# Patient Record
Sex: FEMALE | Race: WHITE | Employment: UNEMPLOYED | URBAN - METROPOLITAN AREA
[De-identification: names, ages, dates, MRNs, and addresses within clinical notes are randomized per-mention and may not be internally consistent; named-entity substitution may affect disease eponyms.]

---

## 2017-05-22 ENCOUNTER — HOSPITAL ENCOUNTER (OUTPATIENT)
Dept: OTHER | Age: 37
Discharge: HOME | End: 2017-05-22
Admitting: CLINIC/CENTER

## 2017-05-22 LAB
REPORT STATUS: NORMAL
REQUEST PROBLEM: NORMAL
SPECIMEN: NORMAL
WET PREP: NORMAL

## 2017-05-24 LAB
CHLAMYDIA TRACHOMATIS AMPLIFIED DET: NEGATIVE
N GONORRHOEAE AMPLIFIED DET: NEGATIVE

## 2017-11-02 ENCOUNTER — HOSPITAL ENCOUNTER (OUTPATIENT)
Dept: PHYSICAL THERAPY | Age: 37
Discharge: HOME | End: 2017-11-30
Attending: FAMILY MEDICINE | Admitting: FAMILY MEDICINE

## 2017-11-04 ASSESSMENT — PAIN DESCRIPTION - LOCATION: LOCATION: LEG;BACK

## 2017-11-04 ASSESSMENT — PAIN DESCRIPTION - ORIENTATION: ORIENTATION: RIGHT

## 2017-11-04 ASSESSMENT — PAIN SCALES - GENERAL: PAINLEVEL_OUTOF10: 5

## 2017-11-04 NOTE — PROGRESS NOTES
interventions checked in plan of care;patient's level of function has been impacted by back pain since 2010; did not observe any barriers to learning during PT eval; learning preferences include demonstration, practice, and handouts; patient expressed understanding of HEP; patient appears to be motivated to participate in an active PT program and to be compliant with HEP expectations;patient assisted in developing treatment plan and goals; no DME is currently being used;      Current functional level (based on )   :  Conditions Requiring Skilled Therapeutic Intervention  Body structures, Functions, Activity limitations: Decreased high-level IADLs  Treatment Diagnosis: LBP  Prognosis: Fair  Decision Making: Low Complexity  Exam: Oswestry/trunk ROM  Clinical Presentation: stable  REQUIRES PT FOLLOW UP: Yes         Plan    see POC    G-Code       OutComes Score   see goals                                                  Goals  Short term goals  Time Frame for Short term goals: check @ 10 sessions  Short term goal 1: 48% Oswestry - @ eval=52%       Therapy Time   Individual Concurrent Group Co-treatment   Time In 1031         Time Out 1100         Minutes 29         Timed Code Treatment Minutes: 8 Minutes       AMMON Curry, PT

## 2017-11-14 ENCOUNTER — HOSPITAL ENCOUNTER (OUTPATIENT)
Dept: PHYSICAL THERAPY | Age: 37
Discharge: HOME | End: 2017-11-14
Admitting: FAMILY MEDICINE

## 2017-12-01 ENCOUNTER — HOSPITAL ENCOUNTER (OUTPATIENT)
Dept: OTHER | Age: 37
Discharge: HOME | End: 2017-12-31
Attending: FAMILY MEDICINE | Admitting: FAMILY MEDICINE

## 2017-12-05 ENCOUNTER — HOSPITAL ENCOUNTER (OUTPATIENT)
Dept: PHYSICAL THERAPY | Age: 37
Discharge: HOME | End: 2017-12-05
Admitting: FAMILY MEDICINE

## 2017-12-05 NOTE — FLOWSHEET NOTE
given cont   Objective   findings:   Pelvic asymmetry with L post innomminate Improvement of pain with the therapy   Provider interaction:  evaluation Pt instr in CLam shell and hip abd str ex for rehab progression MET for correction of pelvic asymmetry and Verbal and manual cueing on proper performance of the prescribed exercise or of the designated task Verbal and manual cueing on proper performance of the prescribed exercise or of the designated task   Response to intervention:   Pt understood discussion and questions were answered. Pt understands their activity limitations and understands rational for treatment progression.  Monitoring to ensure safe and effective activity performance Less pain Decrease of pain   Plan for Next Session: Progress as able Cont PT progress ex as tolerated Cont PT progress ex as tolerated Cont PT progress ex as tolerated     Modality/intervention used:  [x] Therapeutic Exercise  [] Modalities:  [] Therapeutic Activity     [] Ultrasound  [] Elec  Stim  [] Gait Training      [] Cervical Traction [] Lumbar Traction  [] Neuromuscular Re-education    [] Cold/hotpack [] Iontophoresis   [] Instruction in HEP      [] Vasopneumatic     [] Manual Therapy               [] Self care home management                    (    ) Dry needling    Post Tx Pain Ratin/10    Plan:(Fequency/duration/# of visits)   [x] Continue per plan of care [] Alter current plan   [] Plan of care initiated [] Hold pending MD visit [] Discharge         Time In: 435  Time Out: 515  Timed Code Treatment Minutes: 40  Total Treatment Minutes: 40    Electronically signed by:  Isaac Lorenzana 2017, 4:39 PM

## 2018-01-01 ENCOUNTER — HOSPITAL ENCOUNTER (OUTPATIENT)
Dept: OTHER | Age: 38
Discharge: HOME | End: 2018-01-31
Attending: FAMILY MEDICINE | Admitting: FAMILY MEDICINE

## 2018-01-11 ENCOUNTER — HOSPITAL ENCOUNTER (OUTPATIENT)
Dept: PHYSICAL THERAPY | Age: 38
Discharge: HOME | End: 2018-01-11
Admitting: FAMILY MEDICINE

## 2018-01-11 NOTE — FLOWSHEET NOTE
Physical Therapy Daily Treatment Note  Date:  2018    Patient Name:  Senia Ugalde    :  1980  MRN: [id number]  Restrictions/Precautions:    Diagnosis: LBP  Treatment Diagnosis: LBP  Insurance/Certification information:  -allowed 30 PCY  Next Physician Visit:  18 orthopedic in [place]  Referring Physician:    JESSENIA EXPIRATION DATE 18  Visit# / total visits:  7/                  Initial Pain level: 6/10 Mid to LB    Subjective:  Pt  states that her hands are hurting  her-and going to see an Orthopedic tomorrow. I'm not sleeping at all my hands hurt so bad and swollen    Any changes to Ambulatory Summary Sheet? no             Goals:  Short term goals  Time Frame for Short term goals: check @ 10 sessions  Short term goal 1: 48% Oswestry - @ eval=52%     Patient's goal:no back pain  Skilled PT activities: Date 17 Date 11/10/17 #2 Date  17   #3 Date  17   #4 12/15/17 #5 17 #6 18 #7     Outcome measure used   Oswestry       On visit#1            Self care managment Pt received education on their current pathology and how their condition affects functional activities. .               nustep    Seat 8 arms9 lv1 6 min  (pt reported pain in RLB) nustep    Seat 8 arms9 lv1 10 min Nustep S/8/A 9 x 10 min w/ LV 2 Nustep S/8/ x 10 min w/ LV 3 Nustep S/8/ x 10 min w/ LV 3 Nustep S/8/ x 10 min w/ LV 4       TM trial .5 mph 5 min            ellipitical trial  x 5 min X 8 min letting go intermittently to work the core X 8 min letting go intermittently to work the core X 8 min letting go intermittently to work the core Declined- hands hurt so bad       cybex trunk EXT 30 # x10; 35# x10; 40 # x10 reps   [id number] 40#  cybex trunk EXT 30 # x10; 35# x10; 40 # x10 reps  cybex trunk EXT 30 # x10; 35# x10; 40 # x10 reps cybex trunk EXT 40 # x10; 45# x10; 50 # x10 reps       Clam shells #s 1/2/3 R/L x10 reps each Clam shells #s 1/2/3 R/L x10 reps each Clam shells #s 1/2/3 R/L x15

## 2018-02-01 ENCOUNTER — HOSPITAL ENCOUNTER (OUTPATIENT)
Dept: OTHER | Age: 38
Discharge: HOME | End: 2018-02-27
Attending: FAMILY MEDICINE | Admitting: FAMILY MEDICINE

## 2018-09-10 ENCOUNTER — HOSPITAL ENCOUNTER (OUTPATIENT)
Dept: OTHER | Age: 38
Discharge: HOME | End: 2018-09-10
Attending: CLINIC/CENTER | Admitting: CLINIC/CENTER

## 2018-09-13 LAB
CHLAMYDIA TRACHOMATIS AMPLIFIED DET: NEGATIVE
N GONORRHOEAE AMPLIFIED DET: NEGATIVE
T. VAGINALIS BY TMA: NEGATIVE

## 2019-01-23 ENCOUNTER — APPOINTMENT (OUTPATIENT)
Dept: PSYCHIATRY | Age: 39
End: 2019-01-23
Payer: COMMERCIAL

## 2019-01-30 ENCOUNTER — HOSPITAL ENCOUNTER (OUTPATIENT)
Dept: PSYCHIATRY | Age: 39
Setting detail: THERAPIES SERIES
Discharge: HOME | End: 2019-01-30
Payer: COMMERCIAL

## 2019-01-30 PROCEDURE — 80305 DRUG TEST PRSMV DIR OPT OBS: CPT

## 2019-01-30 PROCEDURE — 90834 PSYTX W PT 45 MINUTES: CPT

## 2019-02-06 ENCOUNTER — APPOINTMENT (OUTPATIENT)
Dept: PSYCHIATRY | Age: 39
End: 2019-02-06
Payer: COMMERCIAL

## 2019-02-13 ENCOUNTER — HOSPITAL ENCOUNTER (OUTPATIENT)
Dept: PSYCHIATRY | Age: 39
Setting detail: THERAPIES SERIES
Discharge: HOME | End: 2019-02-13
Payer: COMMERCIAL

## 2019-02-13 PROCEDURE — 90834 PSYTX W PT 45 MINUTES: CPT

## 2019-02-19 ENCOUNTER — HOSPITAL ENCOUNTER (OUTPATIENT)
Dept: PSYCHIATRY | Age: 39
Setting detail: THERAPIES SERIES
Discharge: HOME | End: 2019-02-19
Payer: COMMERCIAL

## 2019-02-19 PROCEDURE — 90853 GROUP PSYCHOTHERAPY: CPT

## 2019-02-20 ENCOUNTER — HOSPITAL ENCOUNTER (OUTPATIENT)
Dept: PSYCHIATRY | Age: 39
Setting detail: THERAPIES SERIES
Discharge: HOME | End: 2019-02-20
Payer: COMMERCIAL

## 2019-02-20 ENCOUNTER — APPOINTMENT (OUTPATIENT)
Dept: PSYCHIATRY | Age: 39
End: 2019-02-20
Payer: COMMERCIAL

## 2019-02-20 PROCEDURE — 90832 PSYTX W PT 30 MINUTES: CPT

## 2019-02-26 ENCOUNTER — HOSPITAL ENCOUNTER (OUTPATIENT)
Dept: PSYCHIATRY | Age: 39
Setting detail: THERAPIES SERIES
Discharge: HOME | End: 2019-02-26
Payer: COMMERCIAL

## 2019-02-26 PROCEDURE — 90853 GROUP PSYCHOTHERAPY: CPT

## 2019-02-27 ENCOUNTER — HOSPITAL ENCOUNTER (OUTPATIENT)
Dept: PSYCHIATRY | Age: 39
Setting detail: THERAPIES SERIES
Discharge: HOME | End: 2019-02-27
Payer: COMMERCIAL

## 2019-02-27 ENCOUNTER — APPOINTMENT (OUTPATIENT)
Dept: PSYCHIATRY | Age: 39
End: 2019-02-27
Payer: COMMERCIAL

## 2019-02-27 PROCEDURE — 80305 DRUG TEST PRSMV DIR OPT OBS: CPT

## 2019-02-27 PROCEDURE — 90834 PSYTX W PT 45 MINUTES: CPT

## 2019-03-05 ENCOUNTER — HOSPITAL ENCOUNTER (OUTPATIENT)
Dept: PSYCHIATRY | Age: 39
Setting detail: THERAPIES SERIES
Discharge: HOME | End: 2019-03-05
Payer: COMMERCIAL

## 2019-03-05 PROCEDURE — 90853 GROUP PSYCHOTHERAPY: CPT

## 2019-03-06 ENCOUNTER — APPOINTMENT (OUTPATIENT)
Dept: PSYCHIATRY | Age: 39
End: 2019-03-06
Payer: COMMERCIAL

## 2019-03-08 ENCOUNTER — HOSPITAL ENCOUNTER (OUTPATIENT)
Dept: PSYCHIATRY | Age: 39
Setting detail: THERAPIES SERIES
Discharge: HOME | End: 2019-03-08
Payer: COMMERCIAL

## 2019-03-08 PROCEDURE — 90834 PSYTX W PT 45 MINUTES: CPT

## 2019-03-08 PROCEDURE — 90853 GROUP PSYCHOTHERAPY: CPT

## 2019-03-12 ENCOUNTER — APPOINTMENT (OUTPATIENT)
Dept: PSYCHIATRY | Age: 39
End: 2019-03-12
Payer: COMMERCIAL

## 2019-03-13 ENCOUNTER — HOSPITAL ENCOUNTER (OUTPATIENT)
Dept: PSYCHIATRY | Age: 39
Setting detail: THERAPIES SERIES
Discharge: HOME | End: 2019-03-13
Payer: COMMERCIAL

## 2019-03-13 PROCEDURE — 90834 PSYTX W PT 45 MINUTES: CPT

## 2019-03-19 ENCOUNTER — HOSPITAL ENCOUNTER (OUTPATIENT)
Dept: PSYCHIATRY | Age: 39
Setting detail: THERAPIES SERIES
Discharge: HOME | End: 2019-03-19
Payer: COMMERCIAL

## 2019-03-19 PROCEDURE — 90853 GROUP PSYCHOTHERAPY: CPT

## 2019-03-20 ENCOUNTER — HOSPITAL ENCOUNTER (OUTPATIENT)
Dept: PSYCHIATRY | Age: 39
Setting detail: THERAPIES SERIES
End: 2019-03-20
Payer: COMMERCIAL

## 2019-03-26 ENCOUNTER — APPOINTMENT (OUTPATIENT)
Dept: PSYCHIATRY | Age: 39
End: 2019-03-26
Payer: COMMERCIAL

## 2019-03-27 ENCOUNTER — HOSPITAL ENCOUNTER (OUTPATIENT)
Dept: PSYCHIATRY | Age: 39
Setting detail: THERAPIES SERIES
Discharge: HOME | End: 2019-03-27
Payer: COMMERCIAL

## 2019-03-27 PROCEDURE — 90834 PSYTX W PT 45 MINUTES: CPT

## 2019-03-27 PROCEDURE — 80305 DRUG TEST PRSMV DIR OPT OBS: CPT

## 2019-04-09 ENCOUNTER — APPOINTMENT (OUTPATIENT)
Dept: PSYCHIATRY | Age: 39
End: 2019-04-09
Payer: COMMERCIAL

## 2019-04-10 ENCOUNTER — HOSPITAL ENCOUNTER (OUTPATIENT)
Dept: PSYCHIATRY | Age: 39
Setting detail: THERAPIES SERIES
Discharge: HOME | End: 2019-04-10
Payer: COMMERCIAL

## 2019-04-10 PROCEDURE — 90853 GROUP PSYCHOTHERAPY: CPT

## 2019-04-10 PROCEDURE — 90834 PSYTX W PT 45 MINUTES: CPT

## 2019-04-16 ENCOUNTER — HOSPITAL ENCOUNTER (OUTPATIENT)
Dept: PSYCHIATRY | Age: 39
Setting detail: THERAPIES SERIES
Discharge: HOME | End: 2019-04-16
Payer: COMMERCIAL

## 2019-04-16 PROCEDURE — 90853 GROUP PSYCHOTHERAPY: CPT

## 2019-04-17 ENCOUNTER — APPOINTMENT (OUTPATIENT)
Dept: PSYCHIATRY | Age: 39
End: 2019-04-17
Payer: COMMERCIAL

## 2019-04-23 ENCOUNTER — APPOINTMENT (OUTPATIENT)
Dept: PSYCHIATRY | Age: 39
End: 2019-04-23
Payer: COMMERCIAL

## 2019-04-24 ENCOUNTER — HOSPITAL ENCOUNTER (OUTPATIENT)
Dept: PSYCHIATRY | Age: 39
Setting detail: THERAPIES SERIES
Discharge: HOME | End: 2019-04-24
Payer: COMMERCIAL

## 2019-04-24 PROCEDURE — 80305 DRUG TEST PRSMV DIR OPT OBS: CPT

## 2019-04-24 PROCEDURE — 90834 PSYTX W PT 45 MINUTES: CPT

## 2019-04-30 ENCOUNTER — APPOINTMENT (OUTPATIENT)
Dept: PSYCHIATRY | Age: 39
End: 2019-04-30
Payer: COMMERCIAL

## 2019-05-01 ENCOUNTER — APPOINTMENT (OUTPATIENT)
Dept: PSYCHIATRY | Age: 39
End: 2019-05-01
Payer: COMMERCIAL

## 2019-05-07 ENCOUNTER — HOSPITAL ENCOUNTER (OUTPATIENT)
Dept: PSYCHIATRY | Age: 39
Setting detail: THERAPIES SERIES
Discharge: HOME | End: 2019-05-07
Payer: COMMERCIAL

## 2019-05-07 PROCEDURE — 90853 GROUP PSYCHOTHERAPY: CPT

## 2019-05-08 ENCOUNTER — HOSPITAL ENCOUNTER (OUTPATIENT)
Dept: PSYCHIATRY | Age: 39
Setting detail: THERAPIES SERIES
Discharge: HOME | End: 2019-05-08
Payer: COMMERCIAL

## 2019-05-08 ENCOUNTER — APPOINTMENT (OUTPATIENT)
Dept: PSYCHIATRY | Age: 39
End: 2019-05-08
Payer: COMMERCIAL

## 2019-05-08 PROCEDURE — 90832 PSYTX W PT 30 MINUTES: CPT

## 2019-05-11 ENCOUNTER — HOSPITAL ENCOUNTER (EMERGENCY)
Age: 39
Discharge: HOME | End: 2019-05-11
Payer: COMMERCIAL

## 2019-05-11 VITALS
BODY MASS INDEX: 31.28 KG/M2 | WEIGHT: 170 LBS | RESPIRATION RATE: 16 BRPM | TEMPERATURE: 98.4 F | HEIGHT: 62 IN | SYSTOLIC BLOOD PRESSURE: 116 MMHG | OXYGEN SATURATION: 96 % | DIASTOLIC BLOOD PRESSURE: 83 MMHG | HEART RATE: 84 BPM

## 2019-05-11 DIAGNOSIS — S61.210A LACERATION OF RIGHT INDEX FINGER WITHOUT FOREIGN BODY WITHOUT DAMAGE TO NAIL, INITIAL ENCOUNTER: ICD-10-CM

## 2019-05-11 DIAGNOSIS — S61.411A LACERATION OF RIGHT HAND WITHOUT FOREIGN BODY, INITIAL ENCOUNTER: Primary | ICD-10-CM

## 2019-05-11 PROCEDURE — 90471 IMMUNIZATION ADMIN: CPT | Performed by: PHYSICIAN ASSISTANT

## 2019-05-11 PROCEDURE — 4500000027

## 2019-05-11 PROCEDURE — 2500000003 HC RX 250 WO HCPCS: Performed by: PHYSICIAN ASSISTANT

## 2019-05-11 PROCEDURE — 6360000002 HC RX W HCPCS: Performed by: PHYSICIAN ASSISTANT

## 2019-05-11 PROCEDURE — 99282 EMERGENCY DEPT VISIT SF MDM: CPT

## 2019-05-11 PROCEDURE — 90715 TDAP VACCINE 7 YRS/> IM: CPT | Performed by: PHYSICIAN ASSISTANT

## 2019-05-11 RX ORDER — LIDOCAINE HYDROCHLORIDE 20 MG/ML
5 INJECTION, SOLUTION EPIDURAL; INFILTRATION; INTRACAUDAL; PERINEURAL ONCE
Status: COMPLETED | OUTPATIENT
Start: 2019-05-11 | End: 2019-05-11

## 2019-05-11 RX ADMIN — LIDOCAINE HYDROCHLORIDE 5 ML: 20 INJECTION, SOLUTION EPIDURAL; INFILTRATION; INTRACAUDAL at 10:30

## 2019-05-11 RX ADMIN — TETANUS TOXOID, REDUCED DIPHTHERIA TOXOID AND ACELLULAR PERTUSSIS VACCINE, ADSORBED 0.5 ML: 5; 2.5; 8; 8; 2.5 SUSPENSION INTRAMUSCULAR at 11:02

## 2019-05-11 ASSESSMENT — PAIN DESCRIPTION - LOCATION: LOCATION: HAND

## 2019-05-11 ASSESSMENT — PAIN DESCRIPTION - PAIN TYPE: TYPE: ACUTE PAIN

## 2019-05-11 ASSESSMENT — PAIN SCALES - GENERAL: PAINLEVEL_OUTOF10: 6

## 2019-05-11 ASSESSMENT — PAIN DESCRIPTION - ORIENTATION: ORIENTATION: RIGHT

## 2019-05-11 NOTE — ED PROVIDER NOTES
[facility]        PCP: Andree Colindres MD    CHIEF COMPLAINT    No chief complaint on file. HPI    Allyson Lizarraga is a 43 y.o. female who presents with lacerations to right hand and right index finger. Onset prior to arrival.  Context is patient was cleaning dishes when a glass broke cutting her hand and index finger. Is associated pain and bleeding. Bleeding has subsided. Pain does not radiate. No distal numbness, tingling, weakness, functional/motor deficit. Pt denies foreign body sensation. Unknown Tetanus Status        REVIEW OF SYSTEMS    General:   Denies symptoms preceding injury. Skin:  SEE HPI  Musculoskeletal:  No distal numbness, tingling. No obvious tendon or motor deficits. Denies any other musculoskeletal injuries or skin trauma. All other review of systems are negative  See HPI and nursing notes for additional information     PAST MEDICAL & SURGICAL HISTORY    Past Medical History:   Diagnosis Date    Anxiety     Colon polyp 4-7-2015    Hemorrhoids 4-7-2015    Hepatitis C     Migraines      Past Surgical History:   Procedure Laterality Date    ADENOIDECTOMY      CHOLECYSTECTOMY      COLONOSCOPY  04/07/2015    Polyp cecum, hemorrhoids    TONSILLECTOMY      TUBAL LIGATION         CURRENT MEDICATIONS    Current Outpatient Rx   Medication Sig Dispense Refill    naproxen (NAPROSYN) 500 MG tablet Take 1 tablet by mouth 2 times daily 60 tablet 0    mineral oil-hydrophilic petrolatum (AQUAPHOR) ointment Apply topically as needed. 396 g 0    ondansetron (ZOFRAN ODT) 4 MG disintegrating tablet Take 1 tablet by mouth every 8 hours as needed for Nausea or Vomiting 10 tablet 0    ibuprofen (ADVIL;MOTRIN) 800 MG tablet Take 1 tablet by mouth every 6 hours as needed for Pain 90 tablet 0    sertraline (ZOLOFT) 50 MG tablet Take 50 mg by mouth daily.  topiramate (TOPAMAX) 50 MG tablet Take 50 mg by mouth 2 times daily.       vitamin B-12 motor deficits. Distal sensation and capillary refill intact. Vascular: Distal pulses and capillary refill intact. Integument:    Laceration #1: Over the dorsal aspect of the right hand in the region of the second metacarpal head there is a flap laceration measuring approximately 1.6 cm and length. No active bleeding. No obvious foreign body. No obvious tendon involvement    Distal sensation, capillary refill intact. Distal joints with full range of motion    Laceration #2:  Over the radial aspect of the proximal phalanx of the right index finger there is a flap laceration measuring approximately 1.7 cm in length. No active bleeding. No obvious foreign body. Obvious tendon involvement. Distal sensation, capillary refill intact. Distal joints with full range of motion. See below for further details. Neurologic:  Awake and alert, normal flow of speech. CN 2-12 intact. Psychiatric: Cooperative, pleasant affect        RADIOLOGY/PROCEDURES    Pt elects to hold on x-rays right hand today and understands that I am unable to fully evaluate for presenting symptoms by omiting this. The patient understands they may return to the ED at any time, without penalty or recrimminations, for reevaluation and to have ridging done.        ________________________________________________________________________       Procedure Note - Yash Morris PA-C      Laceration Repair Procedure Note    Indication: Skin Laceration - #1, right hand    Procedure:   - Procedure explained, including risks and benefits explained to the patient who expressed understanding. All questions were answered. Verbal consent obtained. - The Wound was prepped and draped in the usual sterile fashion using Betadine and sterile saline.  - The wound is anesthetized using 2% lidocaine, approximately 2 ml  - Wound was explored to it's depth:    no foreign bodies.         no compromise of neurovascular or tendon structures  - Wound was irrigated with copious amounts of sterile saline and mechanically debrided utilizing sterile gauze. - The laceration was Closed with 5-0 Prolene sutures, total number of 3,  simple interrupted  - Hemostasis and good cosmesis was achieved. Blood loss minimal.  - The wound area was then dressed with Sterile nonstick dressing, sterile gauze, and tape. - Patient tolerated procedure well without complications. Post procedure exam of the affected region reveals distal sensation, motor, capillary refill, and pulses intact    Total repaired wound length: 1.6cm  ________________________________________________________________________      ________________________________________________________________________       Procedure Note - Gurdeep Wilkerson PA-C      Laceration Repair Procedure Note    Indication: Skin Laceration #2, right index finger    Procedure:   - Procedure explained, including risks and benefits explained to the patient who expressed understanding. All questions were answered. Verbal consent obtained. - The Wound was prepped and draped in the usual sterile fashion using Betadine and sterile saline.  - The wound is anesthetized using 2% lidocaine, approximately 1 ml  - Wound was explored to it's depth:    no foreign bodies. no compromise of neurovascular or tendon structures  - Wound was irrigated with copious amounts of sterile saline and mechanically debrided utilizing sterile gauze. - The laceration was Closed with 5-0 proline sutures, total number of 3, simple interrupted  - Hemostasis and good cosmesis was achieved. Blood loss minimal.  - The wound area was then dressed with Sterile nonstick dressing, sterile gauze, and tape. - Patient tolerated procedure well without complications.     Post procedure exam of the affected region reveals distal sensation, motor, capillary refill, and pulses intact    Total repaired wound length: 1.7cm  ________________________________________________________________________        ED COURSE & MEDICAL DECISION MAKING        I discussed possibility of infection, retained foreign body, tendon injury, nerve injury. Wound care instructions discussed with patient today. Wound check in 2-3 days. Suture/Staple removal in 7 days. (discussed today). Patient placed in splint today to avoid aggressive hand gripping and possible subsequent tearing up sutures-discussed in detail today. May remove splint several times daily. Diagnosis and plan discussed in detail with patient who understands and agrees. Return to emergency Department precautions were discussed in detail with patient who understands and agrees. Vital signs and nursing notes reviewed during ED course. I have independently evaluated this patient. Clinical  IMPRESSION    1. Laceration of right hand without foreign body, initial encounter    2. Laceration of right index finger without foreign body without damage to nail, initial encounter              Comment: Please note this report has been produced using speech recognition software and may contain errors related to that system including errors in grammar, punctuation, and spelling, as well as words and phrases that may be inappropriate. If there are any questions or concerns please feel free to contact the dictating provider for clarification.          Saroj Philip  05/11/19 [id number]

## 2019-05-11 NOTE — ED NOTES
Discharge instructions reviewed with patient. PT verbalizes understanding. All questions answered. Follow up instructions given. PT denies any further needs at this time.       Rony Trevino LPN  90/73/26 [id number]

## 2019-05-11 NOTE — ED NOTES
Applied splint to right pointer  finger  merna taped  to next finger     Nithya Chino  05/11/19 0250

## 2019-05-11 NOTE — LETTER
[facility] Emergency Department  [address] [id number]  Phone: [phone]  Fax: [phone]               May 11, 2019    Patient: Nazario Macario   YOB: 1980   Date of Visit: 5/11/2019       To Whom It May Concern:    Kavya Cary was seen and treated in our emergency department on 5/11/2019. I recommend no gripping or lifting with right hand for one week. Patient to keep wounds clean and dry and covered while at work.       Sincerely,       ECHO Marcos         Signature:__________________________________

## 2019-05-14 ENCOUNTER — APPOINTMENT (OUTPATIENT)
Dept: PSYCHIATRY | Age: 39
End: 2019-05-14
Payer: COMMERCIAL

## 2019-05-15 ENCOUNTER — HOSPITAL ENCOUNTER (OUTPATIENT)
Dept: PSYCHIATRY | Age: 39
Setting detail: THERAPIES SERIES
Discharge: HOME | End: 2019-05-15
Payer: COMMERCIAL

## 2019-05-15 PROCEDURE — 80305 DRUG TEST PRSMV DIR OPT OBS: CPT

## 2019-05-15 PROCEDURE — 90834 PSYTX W PT 45 MINUTES: CPT

## 2019-05-21 ENCOUNTER — APPOINTMENT (OUTPATIENT)
Dept: PSYCHIATRY | Age: 39
End: 2019-05-21
Payer: COMMERCIAL

## 2019-05-22 ENCOUNTER — HOSPITAL ENCOUNTER (OUTPATIENT)
Dept: PSYCHIATRY | Age: 39
Setting detail: THERAPIES SERIES
Discharge: HOME | End: 2019-05-22
Payer: COMMERCIAL

## 2019-05-22 PROCEDURE — 90834 PSYTX W PT 45 MINUTES: CPT

## 2019-05-28 ENCOUNTER — HOSPITAL ENCOUNTER (OUTPATIENT)
Dept: PSYCHIATRY | Age: 39
Setting detail: THERAPIES SERIES
Discharge: HOME | End: 2019-05-28
Payer: COMMERCIAL

## 2019-05-28 PROCEDURE — 90853 GROUP PSYCHOTHERAPY: CPT

## 2019-05-29 ENCOUNTER — HOSPITAL ENCOUNTER (OUTPATIENT)
Dept: PSYCHIATRY | Age: 39
Setting detail: THERAPIES SERIES
Discharge: HOME | End: 2019-05-29
Payer: COMMERCIAL

## 2019-05-29 PROCEDURE — 90834 PSYTX W PT 45 MINUTES: CPT

## 2019-06-20 ENCOUNTER — HOSPITAL ENCOUNTER (OUTPATIENT)
Dept: GENERAL RADIOLOGY | Age: 39
Discharge: HOME | End: 2019-06-20
Payer: COMMERCIAL

## 2019-06-20 ENCOUNTER — HOSPITAL ENCOUNTER (OUTPATIENT)
Age: 39
Discharge: HOME | End: 2019-06-20
Payer: COMMERCIAL

## 2019-06-20 DIAGNOSIS — R06.00 DYSPNEA, UNSPECIFIED TYPE: ICD-10-CM

## 2019-06-20 PROCEDURE — 71046 X-RAY EXAM CHEST 2 VIEWS: CPT

## 2019-09-06 ENCOUNTER — HOSPITAL ENCOUNTER (OUTPATIENT)
Age: 39
Discharge: HOME | End: 2019-09-06
Payer: COMMERCIAL

## 2019-09-06 LAB
ALBUMIN SERPL-MCNC: 4.4 GM/DL (ref 3.4–5)
ALP BLD-CCNC: 69 IU/L (ref 40–128)
ALT SERPL-CCNC: 22 U/L (ref 10–40)
ANION GAP SERPL CALCULATED.3IONS-SCNC: 11 MMOL/L (ref 4–16)
AST SERPL-CCNC: 24 IU/L (ref 15–37)
BILIRUB SERPL-MCNC: 0.3 MG/DL (ref 0–1)
BUN BLDV-MCNC: 10 MG/DL (ref 6–23)
CALCIUM SERPL-MCNC: 9.4 MG/DL (ref 8.3–10.6)
CHLORIDE BLD-SCNC: 101 MMOL/L (ref 99–110)
CO2: 26 MMOL/L (ref 21–32)
CREAT SERPL-MCNC: 0.6 MG/DL (ref 0.6–1.1)
GFR AFRICAN AMERICAN: >60 ML/MIN/1.73M2
GFR NON-AFRICAN AMERICAN: >60 ML/MIN/1.73M2
GLUCOSE BLD-MCNC: 102 MG/DL (ref 70–99)
HAV IGM SER IA-ACNC: NON REACTIVE
HCT VFR BLD CALC: 44.9 % (ref 37–47)
HEMOGLOBIN: 14.7 GM/DL (ref 12.5–16)
HEPATITIS B CORE IGM ANTIBODY: NON REACTIVE
HEPATITIS B SURFACE ANTIGEN: NON REACTIVE
HEPATITIS C ANTIBODY: ABNORMAL
MCH RBC QN AUTO: 31.1 PG (ref 27–31)
MCHC RBC AUTO-ENTMCNC: 32.7 % (ref 32–36)
MCV RBC AUTO: 94.9 FL (ref 78–100)
PDW BLD-RTO: 13 % (ref 11.7–14.9)
PLATELET # BLD: 301 K/CU MM (ref 140–440)
PMV BLD AUTO: 9.9 FL (ref 7.5–11.1)
POTASSIUM SERPL-SCNC: 3.9 MMOL/L (ref 3.5–5.1)
RBC # BLD: 4.73 M/CU MM (ref 4.2–5.4)
SODIUM BLD-SCNC: 138 MMOL/L (ref 135–145)
TOTAL PROTEIN: 6.8 GM/DL (ref 6.4–8.2)
WBC # BLD: 9.2 K/CU MM (ref 4–10.5)

## 2019-09-06 PROCEDURE — 80074 ACUTE HEPATITIS PANEL: CPT

## 2019-09-06 PROCEDURE — 36415 COLL VENOUS BLD VENIPUNCTURE: CPT

## 2019-09-06 PROCEDURE — 80053 COMPREHEN METABOLIC PANEL: CPT

## 2019-09-06 PROCEDURE — 85027 COMPLETE CBC AUTOMATED: CPT

## 2019-10-05 ENCOUNTER — HOSPITAL ENCOUNTER (EMERGENCY)
Age: 39
Discharge: LEFT BEFORE DISCHARGE | End: 2019-10-06
Payer: COMMERCIAL

## 2019-10-05 VITALS
OXYGEN SATURATION: 98 % | WEIGHT: 145 LBS | BODY MASS INDEX: 26.68 KG/M2 | RESPIRATION RATE: 18 BRPM | HEIGHT: 62 IN | DIASTOLIC BLOOD PRESSURE: 92 MMHG | TEMPERATURE: 98 F | SYSTOLIC BLOOD PRESSURE: 147 MMHG | HEART RATE: 103 BPM

## 2019-10-05 PROCEDURE — 4500000002 HC ER NO CHARGE

## 2019-10-05 ASSESSMENT — PAIN SCALES - GENERAL: PAINLEVEL_OUTOF10: 5

## 2019-10-05 ASSESSMENT — PAIN DESCRIPTION - PAIN TYPE: TYPE: ACUTE PAIN

## 2019-10-05 ASSESSMENT — PAIN DESCRIPTION - LOCATION: LOCATION: FINGER (COMMENT WHICH ONE)

## 2019-10-05 ASSESSMENT — PAIN DESCRIPTION - ORIENTATION: ORIENTATION: RIGHT

## 2019-10-06 ENCOUNTER — HOSPITAL ENCOUNTER (EMERGENCY)
Age: 39
Discharge: HOME | End: 2019-10-06
Payer: COMMERCIAL

## 2019-10-06 ENCOUNTER — APPOINTMENT (OUTPATIENT)
Dept: GENERAL RADIOLOGY | Age: 39
End: 2019-10-06
Payer: COMMERCIAL

## 2019-10-06 VITALS
BODY MASS INDEX: 26.68 KG/M2 | WEIGHT: 145 LBS | HEART RATE: 80 BPM | HEIGHT: 62 IN | SYSTOLIC BLOOD PRESSURE: 143 MMHG | DIASTOLIC BLOOD PRESSURE: 97 MMHG | OXYGEN SATURATION: 99 % | RESPIRATION RATE: 20 BRPM | TEMPERATURE: 98.6 F

## 2019-10-06 DIAGNOSIS — S61.210A LACERATION OF RIGHT INDEX FINGER WITHOUT FOREIGN BODY WITHOUT DAMAGE TO NAIL, INITIAL ENCOUNTER: Primary | ICD-10-CM

## 2019-10-06 PROCEDURE — 4500000027: Performed by: PHYSICIAN ASSISTANT

## 2019-10-06 PROCEDURE — 90715 TDAP VACCINE 7 YRS/> IM: CPT | Performed by: PHYSICIAN ASSISTANT

## 2019-10-06 PROCEDURE — 99283 EMERGENCY DEPT VISIT LOW MDM: CPT

## 2019-10-06 PROCEDURE — 4500000027

## 2019-10-06 PROCEDURE — 90471 IMMUNIZATION ADMIN: CPT | Performed by: PHYSICIAN ASSISTANT

## 2019-10-06 PROCEDURE — 6360000002 HC RX W HCPCS: Performed by: PHYSICIAN ASSISTANT

## 2019-10-06 RX ADMIN — TETANUS TOXOID, REDUCED DIPHTHERIA TOXOID AND ACELLULAR PERTUSSIS VACCINE, ADSORBED 0.5 ML: 5; 2.5; 8; 8; 2.5 SUSPENSION INTRAMUSCULAR at 07:54

## 2019-12-25 ENCOUNTER — HOSPITAL ENCOUNTER (EMERGENCY)
Age: 39
Discharge: HOME | End: 2019-12-25
Attending: EMERGENCY MEDICINE
Payer: COMMERCIAL

## 2019-12-25 VITALS
TEMPERATURE: 98.2 F | DIASTOLIC BLOOD PRESSURE: 86 MMHG | WEIGHT: 150 LBS | BODY MASS INDEX: 27.6 KG/M2 | OXYGEN SATURATION: 98 % | RESPIRATION RATE: 16 BRPM | HEART RATE: 83 BPM | SYSTOLIC BLOOD PRESSURE: 129 MMHG | HEIGHT: 62 IN

## 2019-12-25 DIAGNOSIS — N39.0 ACUTE UTI: ICD-10-CM

## 2019-12-25 DIAGNOSIS — T19.2XXA FOREIGN BODY IN VAGINA, INITIAL ENCOUNTER: Primary | ICD-10-CM

## 2019-12-25 LAB
BACTERIA: ABNORMAL /HPF
BILIRUBIN URINE: ABNORMAL MG/DL
BLOOD, URINE: NEGATIVE
CALCIUM OXALATE CRYSTALS: ABNORMAL /HPF
CLARITY: ABNORMAL
COLOR: ABNORMAL
GLUCOSE, URINE: NEGATIVE MG/DL
ICTOTEST: NEGATIVE
INTERPRETATION: NORMAL
KETONES, URINE: NEGATIVE MG/DL
LEUKOCYTE ESTERASE, URINE: ABNORMAL
MUCUS: ABNORMAL HPF
NITRITE URINE, QUANTITATIVE: NEGATIVE
PH, URINE: 5 (ref 5–8)
PREGNANCY, URINE: NEGATIVE
PROTEIN UA: NEGATIVE MG/DL
RBC URINE: 3 /HPF (ref 0–6)
SPECIFIC GRAVITY UA: 1.03 (ref 1–1.03)
SPECIFIC GRAVITY, URINE: 1.03 (ref 1–1.03)
SQUAMOUS EPITHELIAL: 4 /HPF
TRICHOMONAS: ABNORMAL /HPF
UROBILINOGEN, URINE: 2 MG/DL (ref 0.2–1)
WBC UA: 7 /HPF (ref 0–5)

## 2019-12-25 PROCEDURE — 99283 EMERGENCY DEPT VISIT LOW MDM: CPT

## 2019-12-25 PROCEDURE — 81001 URINALYSIS AUTO W/SCOPE: CPT

## 2019-12-25 PROCEDURE — 6370000000 HC RX 637 (ALT 250 FOR IP): Performed by: EMERGENCY MEDICINE

## 2019-12-25 PROCEDURE — 81025 URINE PREGNANCY TEST: CPT

## 2019-12-25 RX ORDER — NITROFURANTOIN 25; 75 MG/1; MG/1
100 CAPSULE ORAL 2 TIMES DAILY
Qty: 14 CAPSULE | Refills: 0 | Status: SHIPPED | OUTPATIENT
Start: 2019-12-25 | End: 2020-01-01

## 2019-12-25 RX ORDER — NITROFURANTOIN 25; 75 MG/1; MG/1
100 CAPSULE ORAL ONCE
Status: COMPLETED | OUTPATIENT
Start: 2019-12-25 | End: 2019-12-25

## 2019-12-25 RX ADMIN — NITROFURANTOIN (MONOHYDRATE/MACROCRYSTALS) 100 MG: 75; 25 CAPSULE ORAL at 23:06

## 2019-12-25 ASSESSMENT — PAIN DESCRIPTION - PAIN TYPE: TYPE: ACUTE PAIN

## 2019-12-25 ASSESSMENT — PAIN DESCRIPTION - LOCATION: LOCATION: ABDOMEN

## 2019-12-25 ASSESSMENT — PAIN SCALES - GENERAL: PAINLEVEL_OUTOF10: 2

## 2019-12-25 ASSESSMENT — PAIN DESCRIPTION - DESCRIPTORS: DESCRIPTORS: CRAMPING

## 2020-03-09 ENCOUNTER — HOSPITAL ENCOUNTER (OUTPATIENT)
Dept: GENERAL RADIOLOGY | Age: 40
Discharge: HOME | End: 2020-03-09
Payer: COMMERCIAL

## 2020-03-09 ENCOUNTER — HOSPITAL ENCOUNTER (OUTPATIENT)
Age: 40
Discharge: HOME | End: 2020-03-09
Payer: COMMERCIAL

## 2020-03-09 PROCEDURE — 72100 X-RAY EXAM L-S SPINE 2/3 VWS: CPT

## 2021-06-01 ENCOUNTER — HOSPITAL ENCOUNTER (EMERGENCY)
Age: 41
Discharge: LEFT BEFORE DISCHARGE | End: 2021-06-01
Payer: COMMERCIAL

## 2021-06-01 ASSESSMENT — PAIN SCALES - GENERAL: PAINLEVEL_OUTOF10: 9

## 2021-06-01 ASSESSMENT — PAIN DESCRIPTION - PAIN TYPE: TYPE: ACUTE PAIN

## 2021-06-02 NOTE — ED NOTES
Pt would not let ED tech get vitals on her. Pt angrily walked out, stating Nithin Hobbs is not waiting all night\".      Duey Like, RN  06/01/21 [id number]

## 2022-03-16 ENCOUNTER — APPOINTMENT (OUTPATIENT)
Dept: GENERAL RADIOLOGY | Age: 42
End: 2022-03-16

## 2022-03-16 ENCOUNTER — HOSPITAL ENCOUNTER (EMERGENCY)
Age: 42
Discharge: HOME | End: 2022-03-16
Attending: EMERGENCY MEDICINE
Payer: COMMERCIAL

## 2022-03-16 ENCOUNTER — HOSPITAL ENCOUNTER (EMERGENCY)
Age: 42
Discharge: LEFT BEFORE DISCHARGE | End: 2022-03-16

## 2022-03-16 VITALS
HEART RATE: 105 BPM | BODY MASS INDEX: 26.68 KG/M2 | SYSTOLIC BLOOD PRESSURE: 113 MMHG | HEIGHT: 62 IN | OXYGEN SATURATION: 98 % | RESPIRATION RATE: 18 BRPM | TEMPERATURE: 98 F | DIASTOLIC BLOOD PRESSURE: 73 MMHG | WEIGHT: 145 LBS

## 2022-03-16 VITALS
HEART RATE: 111 BPM | RESPIRATION RATE: 20 BRPM | SYSTOLIC BLOOD PRESSURE: 140 MMHG | TEMPERATURE: 98.4 F | DIASTOLIC BLOOD PRESSURE: 98 MMHG | OXYGEN SATURATION: 100 %

## 2022-03-16 DIAGNOSIS — L03.119 CELLULITIS OF UPPER EXTREMITY, UNSPECIFIED LATERALITY: Primary | ICD-10-CM

## 2022-03-16 PROCEDURE — 96372 THER/PROPH/DIAG INJ SC/IM: CPT

## 2022-03-16 PROCEDURE — 6360000002 HC RX W HCPCS: Performed by: EMERGENCY MEDICINE

## 2022-03-16 PROCEDURE — 99284 EMERGENCY DEPT VISIT MOD MDM: CPT

## 2022-03-16 RX ORDER — ARIPIPRAZOLE 2 MG/1
TABLET ORAL
COMMUNITY
Start: 2022-01-12

## 2022-03-16 RX ORDER — IBUPROFEN 600 MG/1
600 TABLET ORAL 3 TIMES DAILY PRN
Qty: 30 TABLET | Refills: 0 | Status: SHIPPED | OUTPATIENT
Start: 2022-03-16

## 2022-03-16 RX ORDER — GABAPENTIN 400 MG/1
800 CAPSULE ORAL 3 TIMES DAILY
COMMUNITY

## 2022-03-16 RX ORDER — FLUCONAZOLE 150 MG/1
150 TABLET ORAL ONCE
Qty: 1 TABLET | Refills: 0 | Status: SHIPPED | OUTPATIENT
Start: 2022-03-16 | End: 2022-03-16

## 2022-03-16 RX ORDER — KETOROLAC TROMETHAMINE 30 MG/ML
30 INJECTION, SOLUTION INTRAMUSCULAR; INTRAVENOUS ONCE
Status: COMPLETED | OUTPATIENT
Start: 2022-03-16 | End: 2022-03-16

## 2022-03-16 RX ORDER — CETIRIZINE HYDROCHLORIDE 10 MG/1
10 TABLET ORAL
COMMUNITY

## 2022-03-16 RX ORDER — CEPHALEXIN 500 MG/1
500 CAPSULE ORAL 4 TIMES DAILY
Qty: 40 CAPSULE | Refills: 0 | Status: SHIPPED | OUTPATIENT
Start: 2022-03-16 | End: 2022-03-26

## 2022-03-16 RX ORDER — LAMOTRIGINE 100 MG/1
TABLET ORAL
COMMUNITY
Start: 2022-01-12

## 2022-03-16 RX ORDER — BUPRENORPHINE AND NALOXONE 8; 2 MG/1; MG/1
FILM, SOLUBLE BUCCAL; SUBLINGUAL
COMMUNITY
Start: 2022-03-15

## 2022-03-16 RX ORDER — PANTOPRAZOLE SODIUM 40 MG/1
40 TABLET, DELAYED RELEASE ORAL
COMMUNITY

## 2022-03-16 RX ORDER — CEFTRIAXONE 1 G/1
1000 INJECTION, POWDER, FOR SOLUTION INTRAMUSCULAR; INTRAVENOUS ONCE
Status: COMPLETED | OUTPATIENT
Start: 2022-03-16 | End: 2022-03-16

## 2022-03-16 RX ORDER — SULFAMETHOXAZOLE AND TRIMETHOPRIM 800; 160 MG/1; MG/1
1 TABLET ORAL 2 TIMES DAILY
Qty: 20 TABLET | Refills: 0 | Status: SHIPPED | OUTPATIENT
Start: 2022-03-16 | End: 2022-03-26

## 2022-03-16 RX ADMIN — KETOROLAC TROMETHAMINE 30 MG: 30 INJECTION, SOLUTION INTRAMUSCULAR at 22:46

## 2022-03-16 RX ADMIN — CEFTRIAXONE SODIUM 1000 MG: 1 INJECTION, POWDER, FOR SOLUTION INTRAMUSCULAR; INTRAVENOUS at 22:47

## 2022-03-16 ASSESSMENT — PAIN SCALES - GENERAL
PAINLEVEL_OUTOF10: 7

## 2022-03-16 ASSESSMENT — PAIN DESCRIPTION - ORIENTATION
ORIENTATION: RIGHT
ORIENTATION: RIGHT

## 2022-03-16 ASSESSMENT — PAIN DESCRIPTION - PAIN TYPE
TYPE: ACUTE PAIN
TYPE: ACUTE PAIN

## 2022-03-16 ASSESSMENT — PAIN DESCRIPTION - LOCATION
LOCATION: ARM
LOCATION: ARM

## 2022-03-16 ASSESSMENT — PAIN DESCRIPTION - ONSET: ONSET: PROGRESSIVE

## 2022-03-16 ASSESSMENT — PAIN - FUNCTIONAL ASSESSMENT
PAIN_FUNCTIONAL_ASSESSMENT: 0-10
PAIN_FUNCTIONAL_ASSESSMENT: ACTIVITIES ARE NOT PREVENTED

## 2022-03-16 ASSESSMENT — PAIN DESCRIPTION - DESCRIPTORS
DESCRIPTORS: ACHING;SORE
DESCRIPTORS: NUMBNESS

## 2022-03-16 ASSESSMENT — PAIN DESCRIPTION - PROGRESSION: CLINICAL_PROGRESSION: GRADUALLY WORSENING

## 2022-03-16 ASSESSMENT — PAIN DESCRIPTION - FREQUENCY: FREQUENCY: CONTINUOUS

## 2022-03-17 ASSESSMENT — ENCOUNTER SYMPTOMS
VOMITING: 0
SORE THROAT: 0
ABDOMINAL PAIN: 0
EYE PAIN: 0
RHINORRHEA: 0
EYE DISCHARGE: 0
BACK PAIN: 0
SHORTNESS OF BREATH: 0
COUGH: 0
NAUSEA: 0

## 2022-03-17 NOTE — ED PROVIDER NOTES
Skin: Negative for pallor. Neurological: Negative for dizziness, facial asymmetry, light-headedness, numbness and headaches. Psychiatric/Behavioral: Negative for confusion. Except as noted above the remainder of the review of systems was reviewed and negative. PAST MEDICAL HISTORY     Past Medical History:   Diagnosis Date    Anxiety     Colon polyp 4-7-2015    Hemorrhoids 4-7-2015    Hepatitis C     IV drug abuse ([facility] [address]     Migraines        Prior to Admission medications    Medication Sig Start Date End Date Taking? Authorizing Provider   gabapentin (NEURONTIN) 400 MG capsule Take 800 mg by mouth 3 times daily.    Yes Historical Provider, MD   cephALEXin (KEFLEX) 500 MG capsule Take 1 capsule by mouth 4 times daily for 10 days 3/16/22 3/26/22 Yes Cande Bland MD   sulfamethoxazole-trimethoprim (BACTRIM DS) 800-160 MG per tablet Take 1 tablet by mouth 2 times daily for 10 days 3/16/22 3/26/22 Yes Cande Bland MD   ibuprofen (ADVIL;MOTRIN) 600 MG tablet Take 1 tablet by mouth 3 times daily as needed for Pain 3/16/22  Yes Cande Bland MD   ARIPiprazole (ABILIFY) 2 MG tablet take 1 tablet by mouth at bedtime 1/12/22   Historical Provider, MD   buprenorphine-naloxone (SUBOXONE) 8-2 MG FILM SL film dissolve 2 FILMS under the tongue once daily 3/15/22   Historical Provider, MD   cetirizine (ZYRTEC) 10 MG tablet Take 10 mg by mouth    Historical Provider, MD   pantoprazole (PROTONIX) 40 MG tablet Take 40 mg by mouth    Historical Provider, MD   lamoTRIgine (LAMICTAL) 100 MG tablet take 1 tablet by mouth twice a day 1/12/22   Historical Provider, MD        Patient Active Problem List   Diagnosis    Cellulitis and abscess of upper arm and forearm         SURGICAL HISTORY       Past Surgical History:   Procedure Laterality Date    ADENOIDECTOMY      CHOLECYSTECTOMY      COLONOSCOPY  04/07/2015    Polyp cecum, hemorrhoids    TONSILLECTOMY      TUBAL LIGATION           CURRENT MEDICATIONS       Discharge Medication List as of 3/16/2022 10:54 PM      CONTINUE these medications which have NOT CHANGED    Details   gabapentin (NEURONTIN) 400 MG capsule Take 800 mg by mouth 3 times daily. Historical Med      ARIPiprazole (ABILIFY) 2 MG tablet take 1 tablet by mouth at bedtimeHistorical Med      buprenorphine-naloxone (SUBOXONE) 8-2 MG FILM SL film dissolve 2 FILMS under the tongue once dailyHistorical Med      cetirizine (ZYRTEC) 10 MG tablet Take 10 mg by mouthHistorical Med      pantoprazole (PROTONIX) 40 MG tablet Take 40 mg by mouthHistorical Med      lamoTRIgine (LAMICTAL) 100 MG tablet take 1 tablet by mouth twice a dayHistorical Med             ALLERGIES     Patient has no known allergies. FAMILY HISTORY     History reviewed. No pertinent family history. SOCIAL HISTORY       Social History     Socioeconomic History    Marital status:      Spouse name: None    Number of children: None    Years of education: None    Highest education level: None   Occupational History    None   Tobacco Use    Smoking status: Current Every Day Smoker     Packs/day: 1.00     Types: Cigarettes    Smokeless tobacco: Never Used   Substance and Sexual Activity    Alcohol use: No    Drug use: Yes     Types: IV, Methamphetamines (Crystal Meth)     Comment: heroin    Sexual activity: Yes     Partners: Male   Other Topics Concern    None   Social History Narrative    None     Social Determinants of Health     Financial Resource Strain:     Difficulty of Paying Living Expenses: Not on file   Food Insecurity:     Worried About Running Out of Food in the Last Year: Not on file    Louise of Food in the Last Year: Not on file   Transportation Needs:     Lack of Transportation (Medical): Not on file    Lack of Transportation (Non-Medical):  Not on file   Physical Activity:     Days of Exercise per Week: Not on file    Minutes of Exercise per Session: Not on file   Stress:     Feeling of Stress : Not on file   Social Connections:     Frequency of Communication with Friends and Family: Not on file    Frequency of Social Gatherings with Friends and Family: Not on file    Attends Taoist Services: Not on file    Active Member of Clubs or Organizations: Not on file    Attends Club or Organization Meetings: Not on file    Marital Status: Not on file   Intimate Partner Violence:     Fear of Current or Ex-Partner: Not on file    Emotionally Abused: Not on file    Physically Abused: Not on file    Sexually Abused: Not on file   Housing Stability:     Unable to Pay for Housing in the Last Year: Not on file    Number of [place] in the Last Year: Not on file    Unstable Housing in the Last Year: Not on file       SCREENINGS    William Coma Scale  Eye Opening: Spontaneous  Best Verbal Response: Oriented  Best Motor Response: Obeys commands  William Coma Scale Score: 15          PHYSICAL EXAM    (up to 7 for level 4, 8 or more for level 5)     ED Triage Vitals [03/16/22 2140]   BP Temp Temp Source Pulse Resp SpO2 Height Weight   136/87 98 °F (36.7 °C) Oral 98 18 99 % 5' 2\" (1.575 m) 145 lb (65.8 kg)       Physical Exam  Vitals reviewed. Constitutional:       Appearance: She is not ill-appearing or toxic-appearing. HENT:      Head: Normocephalic and atraumatic. Nose: Nose normal. No congestion or rhinorrhea. Mouth/Throat:      Mouth: Mucous membranes are dry. Pharynx: No oropharyngeal exudate or posterior oropharyngeal erythema. Eyes:      Extraocular Movements: Extraocular movements intact. Pupils: Pupils are equal, round, and reactive to light. Cardiovascular:      Rate and Rhythm: Tachycardia present. Heart sounds: No friction rub. No gallop. Pulmonary:      Effort: Pulmonary effort is normal. No respiratory distress. Chest:      Chest wall: No tenderness. Abdominal:      Palpations: Abdomen is soft. Tenderness:  There is no abdominal tenderness. There is no guarding. Musculoskeletal:         General: No tenderness or deformity. Normal range of motion. Cervical back: Normal range of motion and neck supple. No rigidity. Comments: 2+ radial pulses in b/l upper extremities; moving extremities spontanoeusly   Skin:     General: Skin is warm. Capillary Refill: Capillary refill takes less than 2 seconds. Findings: Erythema present. Comments: Areas on right forearm; left foream and left neck that are indurated and erythematous; no fluctunace; some streaking   Neurological:      General: No focal deficit present. Mental Status: She is alert and oriented to person, place, and time. DIAGNOSTIC RESULTS     Labs Reviewed - No data to display       RADIOLOGY:     Non-plain film images such as CT, Ultrasound and MRI are read by the radiologist. Plain radiographic images are visualized and preliminarily interpreted by the emergency physician. Interpretation per the Radiologist below, if available at the time of this note:    No orders to display         ED BEDSIDE ULTRASOUND:   Performed by ED Physician Emmy Maguire MD       LABS:  Labs Reviewed - No data to display    All other labs were within normal range or not returned as of this dictation. EMERGENCY DEPARTMENT COURSE and DIFFERENTIAL DIAGNOSIS/MDM:   Vitals:    Vitals:    03/16/22 2140 03/16/22 2244   BP: 136/87 113/73   Pulse: 98 105   Resp: 18 18   Temp: 98 °F (36.7 °C)    TempSrc: Oral    SpO2: 99% 98%   Weight: 145 lb (65.8 kg)    Height: 5' 2\" (1.575 m)            MDM  Number of Diagnoses or Management Options  Cellulitis of upper extremity, unspecified laterality  Diagnosis management comments: 58-year-old female presents for with multiple areas of cellulitis on her arms and neck. She injected methamphetamine 1 days ago. She believes she missed veins. She denies any significant history of injection drug use.   She has pain and swelling in areas on her right forearm, left arm and left neck. Denies any fevers. She presents with mild tachycardia. Vitals otherwise unremarkable. She is afebrile. Blood pressure within normal limits. She is moving extremity spontaneously. She has no fluctuance in any of the areas of concern. They are erythematous and indurated. I did discuss obtaining CT scans to evaluate for depth of infection however she declines at this time. We did discuss the risks of symptom progression and she demonstrates understanding shows appropriate capacity to decline imaging. She would prefer to try a course of oral antibiotics first to see if that improves what appears to be cellulitis. She is given a dose of ceftriaxone emergency department. She is also given ketorolac. She prescribed cephalexin and Bactrim for home. She is comfortable to primary care physician. I did  her against the use of injection drug use. She is moving her extremity spontaneously without difficulty. She is ambulatory without difficulty. The areas of concern did not cross any joints. No clinical signs of septic joint. She is discharged in stable condition. Strict return precautions for worsening concerning symptoms are discussed. -  Patient seen and evaluated in the emergency department. -  Triage and nursing notes reviewed and incorporated. -  Old chart records reviewed and incorporated. -  Work-up included:  See above  -  Results discussed with patient. CONSULTS:  None    PROCEDURES:  None performed unless otherwise noted below     Procedures        FINAL IMPRESSION      1.  Cellulitis of upper extremity, unspecified laterality          DISPOSITION/PLAN   DISPOSITION Decision To Discharge 03/16/2022 10:52:06 PM      PATIENT REFERRED TO:  [address]  [facility] [address]  Schedule an appointment as soon as possible for a visit in 3 days  Please follow-up with a primary care physician for monitoring the infection in your skin      DISCHARGE MEDICATIONS:  Discharge Medication List as of 3/16/2022 10:54 PM      START taking these medications    Details   cephALEXin (KEFLEX) 500 MG capsule Take 1 capsule by mouth 4 times daily for 10 days, Disp-40 capsule, R-0Normal      sulfamethoxazole-trimethoprim (BACTRIM DS) 800-160 MG per tablet Take 1 tablet by mouth 2 times daily for 10 days, Disp-20 tablet, R-0Normal      fluconazole (DIFLUCAN) 150 MG tablet Take 1 tablet by mouth once for 1 dose, Disp-1 tablet, R-0Normal             ED Provider Disposition Time  DISPOSITION Decision To Discharge 03/16/2022 10:52:06 PM      Appropriate personal protective equipment was worn during the patient's evaluation. These included surgical, eye protection, surgical mask or in 95 respirator and gloves. The patient was also placed in a surgical mask for the prevention of possible spread of respiratory viral illnesses. The Patient was instructed to read the package inserts with any medication that was prescribed. Major potential reactions and medication interactions were discussed. The Patient understands that there are numerous possible adverse reactions not covered. The patient was also instructed to arrange follow-up with his or her primary care provider for review of any pending labwork or incidental findings on any radiology results that were obtained. All efforts were made to discuss any incidental findings that require further monitoring. Controlled Substances Monitoring:     No flowsheet data found.     (Please note that portions of this note were completed with a voice recognition program.  Efforts were made to edit the dictations but occasionally words are mis-transcribed.)    Chris George MD (electronically signed)  Attending Emergency Physician           Chris George MD  03/17/22 7477

## 2022-03-17 NOTE — ED NOTES
Patient prepared for and ready to be discharged. Patient discharged at this time in no acute distress after verbalizing understanding of discharge instructions. Patient left after receiving After Visit Summary instructions.       Irwin Anderson RN  03/16/22 5922

## 2022-04-04 ENCOUNTER — HOSPITAL ENCOUNTER (INPATIENT)
Age: 42
LOS: 4 days | Discharge: HOME | DRG: 364 | End: 2022-04-08
Attending: EMERGENCY MEDICINE
Payer: COMMERCIAL

## 2022-04-04 DIAGNOSIS — L03.113 CELLULITIS OF RIGHT UPPER EXTREMITY: ICD-10-CM

## 2022-04-04 DIAGNOSIS — L02.91 ABSCESS: Primary | ICD-10-CM

## 2022-04-04 LAB
ALBUMIN SERPL-MCNC: 3.8 GM/DL (ref 3.4–5)
ALP BLD-CCNC: 50 IU/L (ref 40–129)
ALT SERPL-CCNC: 13 U/L (ref 10–40)
ANION GAP SERPL CALCULATED.3IONS-SCNC: 10 MMOL/L (ref 4–16)
AST SERPL-CCNC: 15 IU/L (ref 15–37)
BASOPHILS ABSOLUTE: 0 K/CU MM
BASOPHILS RELATIVE PERCENT: 0.5 % (ref 0–1)
BILIRUB SERPL-MCNC: 0.2 MG/DL (ref 0–1)
BUN BLDV-MCNC: 7 MG/DL (ref 6–23)
CALCIUM SERPL-MCNC: 8.4 MG/DL (ref 8.3–10.6)
CHLORIDE BLD-SCNC: 105 MMOL/L (ref 99–110)
CO2: 24 MMOL/L (ref 21–32)
CREAT SERPL-MCNC: 0.5 MG/DL (ref 0.6–1.1)
DIFFERENTIAL TYPE: ABNORMAL
EOSINOPHILS ABSOLUTE: 0.2 K/CU MM
EOSINOPHILS RELATIVE PERCENT: 2.2 % (ref 0–3)
GFR AFRICAN AMERICAN: >60 ML/MIN/1.73M2
GFR NON-AFRICAN AMERICAN: >60 ML/MIN/1.73M2
GLUCOSE BLD-MCNC: 103 MG/DL (ref 70–99)
HCT VFR BLD CALC: 41.9 % (ref 37–47)
HEMOGLOBIN: 13.8 GM/DL (ref 12.5–16)
IMMATURE NEUTROPHIL %: 0.4 % (ref 0–0.43)
LACTATE: 1.4 MMOL/L (ref 0.4–2)
LYMPHOCYTES ABSOLUTE: 2.1 K/CU MM
LYMPHOCYTES RELATIVE PERCENT: 28.5 % (ref 24–44)
MCH RBC QN AUTO: 32 PG (ref 27–31)
MCHC RBC AUTO-ENTMCNC: 32.9 % (ref 32–36)
MCV RBC AUTO: 97.2 FL (ref 78–100)
MONOCYTES ABSOLUTE: 0.6 K/CU MM
MONOCYTES RELATIVE PERCENT: 7.9 % (ref 0–4)
PDW BLD-RTO: 13 % (ref 11.7–14.9)
PLATELET # BLD: 346 K/CU MM (ref 140–440)
PMV BLD AUTO: 9.2 FL (ref 7.5–11.1)
POTASSIUM SERPL-SCNC: 3.6 MMOL/L (ref 3.5–5.1)
RBC # BLD: 4.31 M/CU MM (ref 4.2–5.4)
SEGMENTED NEUTROPHILS ABSOLUTE COUNT: 4.4 K/CU MM
SEGMENTED NEUTROPHILS RELATIVE PERCENT: 60.5 % (ref 36–66)
SODIUM BLD-SCNC: 139 MMOL/L (ref 135–145)
TOTAL IMMATURE NEUTOROPHIL: 0.03 K/CU MM
TOTAL PROTEIN: 6 GM/DL (ref 6.4–8.2)
WBC # BLD: 7.3 K/CU MM (ref 4–10.5)

## 2022-04-04 PROCEDURE — 80053 COMPREHEN METABOLIC PANEL: CPT

## 2022-04-04 PROCEDURE — 6370000000 HC RX 637 (ALT 250 FOR IP): Performed by: STUDENT IN AN ORGANIZED HEALTH CARE EDUCATION/TRAINING PROGRAM

## 2022-04-04 PROCEDURE — 1200000000 HC SEMI PRIVATE

## 2022-04-04 PROCEDURE — 83605 ASSAY OF LACTIC ACID: CPT

## 2022-04-04 PROCEDURE — 6360000002 HC RX W HCPCS: Performed by: STUDENT IN AN ORGANIZED HEALTH CARE EDUCATION/TRAINING PROGRAM

## 2022-04-04 PROCEDURE — 87040 BLOOD CULTURE FOR BACTERIA: CPT

## 2022-04-04 PROCEDURE — 2580000003 HC RX 258: Performed by: EMERGENCY MEDICINE

## 2022-04-04 PROCEDURE — 2580000003 HC RX 258: Performed by: STUDENT IN AN ORGANIZED HEALTH CARE EDUCATION/TRAINING PROGRAM

## 2022-04-04 PROCEDURE — 99283 EMERGENCY DEPT VISIT LOW MDM: CPT

## 2022-04-04 PROCEDURE — G0378 HOSPITAL OBSERVATION PER HR: HCPCS

## 2022-04-04 PROCEDURE — 96375 TX/PRO/DX INJ NEW DRUG ADDON: CPT

## 2022-04-04 PROCEDURE — 6360000002 HC RX W HCPCS: Performed by: EMERGENCY MEDICINE

## 2022-04-04 PROCEDURE — 85025 COMPLETE CBC W/AUTO DIFF WBC: CPT

## 2022-04-04 PROCEDURE — 96374 THER/PROPH/DIAG INJ IV PUSH: CPT

## 2022-04-04 RX ORDER — BUPRENORPHINE AND NALOXONE 8; 2 MG/1; MG/1
2 FILM, SOLUBLE BUCCAL; SUBLINGUAL DAILY
Status: DISCONTINUED | OUTPATIENT
Start: 2022-04-05 | End: 2022-04-06

## 2022-04-04 RX ORDER — ARIPIPRAZOLE 2 MG/1
2 TABLET ORAL NIGHTLY
Status: DISCONTINUED | OUTPATIENT
Start: 2022-04-04 | End: 2022-04-08 | Stop reason: HOSPADM

## 2022-04-04 RX ORDER — SODIUM CHLORIDE 9 MG/ML
INJECTION, SOLUTION INTRAVENOUS PRN
Status: DISCONTINUED | OUTPATIENT
Start: 2022-04-04 | End: 2022-04-08 | Stop reason: HOSPADM

## 2022-04-04 RX ORDER — MORPHINE SULFATE 4 MG/ML
4 INJECTION, SOLUTION INTRAMUSCULAR; INTRAVENOUS ONCE
Status: COMPLETED | OUTPATIENT
Start: 2022-04-04 | End: 2022-04-04

## 2022-04-04 RX ORDER — ONDANSETRON 2 MG/ML
4 INJECTION INTRAMUSCULAR; INTRAVENOUS ONCE
Status: COMPLETED | OUTPATIENT
Start: 2022-04-04 | End: 2022-04-04

## 2022-04-04 RX ORDER — ONDANSETRON 2 MG/ML
4 INJECTION INTRAMUSCULAR; INTRAVENOUS EVERY 6 HOURS PRN
Status: DISCONTINUED | OUTPATIENT
Start: 2022-04-04 | End: 2022-04-08 | Stop reason: HOSPADM

## 2022-04-04 RX ORDER — SODIUM CHLORIDE 0.9 % (FLUSH) 0.9 %
5-40 SYRINGE (ML) INJECTION EVERY 12 HOURS SCHEDULED
Status: DISCONTINUED | OUTPATIENT
Start: 2022-04-04 | End: 2022-04-08 | Stop reason: HOSPADM

## 2022-04-04 RX ORDER — GABAPENTIN 400 MG/1
800 CAPSULE ORAL 3 TIMES DAILY
Status: DISCONTINUED | OUTPATIENT
Start: 2022-04-04 | End: 2022-04-08 | Stop reason: HOSPADM

## 2022-04-04 RX ORDER — ONDANSETRON 4 MG/1
4 TABLET, ORALLY DISINTEGRATING ORAL EVERY 8 HOURS PRN
Status: DISCONTINUED | OUTPATIENT
Start: 2022-04-04 | End: 2022-04-08 | Stop reason: HOSPADM

## 2022-04-04 RX ORDER — ACETAMINOPHEN 325 MG/1
650 TABLET ORAL EVERY 6 HOURS PRN
Status: DISCONTINUED | OUTPATIENT
Start: 2022-04-04 | End: 2022-04-04

## 2022-04-04 RX ORDER — SODIUM CHLORIDE 0.9 % (FLUSH) 0.9 %
5-40 SYRINGE (ML) INJECTION PRN
Status: DISCONTINUED | OUTPATIENT
Start: 2022-04-04 | End: 2022-04-08 | Stop reason: HOSPADM

## 2022-04-04 RX ORDER — LAMOTRIGINE 100 MG/1
100 TABLET ORAL 2 TIMES DAILY
Status: DISCONTINUED | OUTPATIENT
Start: 2022-04-04 | End: 2022-04-08 | Stop reason: HOSPADM

## 2022-04-04 RX ORDER — IBUPROFEN 400 MG/1
400 TABLET ORAL EVERY 6 HOURS PRN
Status: DISCONTINUED | OUTPATIENT
Start: 2022-04-04 | End: 2022-04-08 | Stop reason: HOSPADM

## 2022-04-04 RX ORDER — CETIRIZINE HYDROCHLORIDE 10 MG/1
10 TABLET ORAL DAILY
Status: DISCONTINUED | OUTPATIENT
Start: 2022-04-05 | End: 2022-04-08 | Stop reason: HOSPADM

## 2022-04-04 RX ORDER — PANTOPRAZOLE SODIUM 40 MG/1
40 TABLET, DELAYED RELEASE ORAL
Status: DISCONTINUED | OUTPATIENT
Start: 2022-04-05 | End: 2022-04-08 | Stop reason: HOSPADM

## 2022-04-04 RX ORDER — POLYETHYLENE GLYCOL 3350 17 G/17G
17 POWDER, FOR SOLUTION ORAL DAILY PRN
Status: DISCONTINUED | OUTPATIENT
Start: 2022-04-04 | End: 2022-04-08 | Stop reason: HOSPADM

## 2022-04-04 RX ADMIN — LAMOTRIGINE 100 MG: 100 TABLET ORAL at 23:20

## 2022-04-04 RX ADMIN — IBUPROFEN 400 MG: 400 TABLET, FILM COATED ORAL at 23:31

## 2022-04-04 RX ADMIN — MORPHINE SULFATE 4 MG: 4 INJECTION, SOLUTION INTRAMUSCULAR; INTRAVENOUS at 17:24

## 2022-04-04 RX ADMIN — ARIPIPRAZOLE 2 MG: 2 TABLET ORAL at 23:20

## 2022-04-04 RX ADMIN — PIPERACILLIN SODIUM AND TAZOBACTAM SODIUM 3375 MG: 3; 375 INJECTION, POWDER, FOR SOLUTION INTRAVENOUS at 23:29

## 2022-04-04 RX ADMIN — ONDANSETRON 4 MG: 2 INJECTION INTRAMUSCULAR; INTRAVENOUS at 17:24

## 2022-04-04 RX ADMIN — VANCOMYCIN HYDROCHLORIDE 1250 MG: 1 INJECTION, POWDER, LYOPHILIZED, FOR SOLUTION INTRAVENOUS at 17:31

## 2022-04-04 RX ADMIN — GABAPENTIN 800 MG: 400 CAPSULE ORAL at 23:20

## 2022-04-04 RX ADMIN — SODIUM CHLORIDE, PRESERVATIVE FREE 10 ML: 5 INJECTION INTRAVENOUS at 23:20

## 2022-04-04 ASSESSMENT — PAIN SCALES - GENERAL
PAINLEVEL_OUTOF10: 7
PAINLEVEL_OUTOF10: 3

## 2022-04-04 ASSESSMENT — PAIN - FUNCTIONAL ASSESSMENT: PAIN_FUNCTIONAL_ASSESSMENT: 0-10

## 2022-04-04 NOTE — ED PROVIDER NOTES
EMERGENCY DEPARTMENT ENCOUNTER      CHIEF COMPLAINT:   Abscess    HPI: Thiago Angel is a 42 y.o. female who presents to the emergency department, complaining of pain and swelling with an abscess to her right forearm. The patient states that she relapsed on drugs 3 weeks ago and shot up crack cocaine into the right forearm. She thinks that she missed the vein. She developed swelling, redness and pain and was seen in the emergency department. She was found to have cellulitis and has completed a 10-day course of Bactrim and Keflex. Over the past 3 days, she has developed an area of worsening pain, redness and swelling. There is no spontaneous drainage. She states it is extremely painful. It is achy and throbbing in nature. She rates it as 7 out of 10 pain. It is worse with movement and if you touch it and better with rest.  She states she feels generally unwell and malaised. She denies any other complaints. REVIEW OF SYSTEMS:   Constitutional: See HPI  Eyes:  Denies change in visual acuity  HENT:  Denies nasal congestion or sore throat  Respiratory:  Denies cough or shortness of breath  Cardiovascular:  Denies chest pain or edema  GI:  Denies abdominal pain, nausea, vomiting, bloody stools or diarrhea  :  Denies dysuria  Musculoskeletal:  Denies back pain or joint pain  Integument:  Denies rash  Neurologic:  Denies headache, focal weakness or sensory changes  \"Remaining review of systems reviewed and negative. I have reviewed the nursing triage documentation and agree unless otherwise noted below. \"      PAST MEDICAL HISTORY:   Past Medical History:   Diagnosis Date    Anxiety     Colon polyp 4-7-2015    Hemorrhoids 4-7-2015    Hepatitis C     IV drug abuse ([facility] [address]     Migraines        CURRENT MEDICATIONS:   Home medications reviewed.     SURGICAL HISTORY:   Past Surgical History:   Procedure Laterality Date    ADENOIDECTOMY      CHOLECYSTECTOMY      COLONOSCOPY  04/07/2015    Polyp cecum, hemorrhoids    TONSILLECTOMY      TUBAL LIGATION         FAMILY HISTORY:   Family History   Problem Relation Age of Onset    No Known Problems Mother     Stroke Father        SOCIAL HISTORY:   Social History     Socioeconomic History    Marital status:      Spouse name: Not on file    Number of children: Not on file    Years of education: Not on file    Highest education level: Not on file   Occupational History    Not on file   Tobacco Use    Smoking status: Current Every Day Smoker     Packs/day: 0.50     Types: Cigarettes    Smokeless tobacco: Never Used   Substance and Sexual Activity    Alcohol use: No    Drug use: Yes     Types: IV, Methamphetamines (Crystal Meth)     Comment: heroin    Sexual activity: Yes     Partners: Male   Other Topics Concern    Not on file   Social History Narrative    Not on file     Social Determinants of Health     Financial Resource Strain:     Difficulty of Paying Living Expenses: Not on file   Food Insecurity:     Worried About Running Out of Food in the Last Year: Not on file    Louise of Food in the Last Year: Not on file   Transportation Needs:     Lack of Transportation (Medical): Not on file    Lack of Transportation (Non-Medical):  Not on file   Physical Activity:     Days of Exercise per Week: Not on file    Minutes of Exercise per Session: Not on file   Stress:     Feeling of Stress : Not on file   Social Connections:     Frequency of Communication with Friends and Family: Not on file    Frequency of Social Gatherings with Friends and Family: Not on file    Attends Hoahaoism Services: Not on file    Active Member of Clubs or Organizations: Not on file    Attends Club or Organization Meetings: Not on file    Marital Status: Not on file   Intimate Partner Violence:     Fear of Current or Ex-Partner: Not on file    Emotionally Abused: Not on file    Physically Abused: Not on file    Sexually Abused: Not on file   Housing Stability:  Unable to Pay for Housing in the Last Year: Not on file    Number of Places Lived in the Last Year: Not on file    Unstable Housing in the Last Year: Not on file       ALLERGIES: Patient has no known allergies. PHYSICAL EXAM:  VITAL SIGNS:   ED Triage Vitals [04/04/22 1532]   Enc Vitals Group      /75      Pulse 98      Resp 15      Temp 97.9 °F (36.6 °C)      Temp Source Infrared      SpO2 100 %      Weight 140 lb (63.5 kg)      Height 5' 2\" (1.575 m)      Head Circumference       Peak Flow       Pain Score       Pain Loc       Pain Edu? Excl. in [address]? Constitutional:  Non-toxic appearance  HENT: Normocephalic, Atraumatic, Bilateral external ears normal, Oropharynx moist, No oral exudates, Nose normal.  Eyes:  PERRL,Conjunctiva normal, No discharge. Neck: Normal range of motion, No tenderness, Supple, No stridor, No lymphadenopathy. Cardiovascular:  Normal heart rate, Normal rhythm  Pulmonary/Chest:  Normal breath sounds, No respiratory distress, No wheezing  Abdomen: Bowel sounds normal, Soft, No tenderness, No masses, No pulsatile masses  Extremities:  There is a 4 cm x 4 cm abscess noted to the volar surface of the right forearm, just distal to the antecubital fossa, it is indurated, no active drainage, no necrosis, no skin sloughing, no bulla, no crepitus, there is soft tissue swelling and induration extending from just above the wrist to just above the antecubital fossa, the area is tender to palpation throughout, radial pulses strong, the hand is pink, warm and well-perfused, capillary refill is brisk, no cyanosis, remaining extremities are unremarkable  Skin:  Warm, Dry, see extremity exam      EKG Interpretation  None    Radiology / Procedures:  Labs Reviewed   CBC WITH AUTO DIFFERENTIAL - Abnormal; Notable for the following components:       Result Value    MCH 32.0 (*)     Monocytes % 7.9 (*)     All other components within normal limits   COMPREHENSIVE METABOLIC PANEL - Abnormal; Notable for the following components:    CREATININE 0.5 (*)     Glucose 103 (*)     Total Protein 6.0 (*)     All other components within normal limits   CBC WITH AUTO DIFFERENTIAL - Abnormal; Notable for the following components:    WBC 11.3 (*)     RBC 4.17 (*)     MCH 31.7 (*)     Monocytes % 6.5 (*)     All other components within normal limits   PROTIME-INR - Abnormal; Notable for the following components:    Protime 11.4 (*)     All other components within normal limits   CULTURE, BLOOD 2   CULTURE, BLOOD 1   CULTURE, MRSA, SCREENING   LACTIC ACID   BASIC METABOLIC PANEL W/ REFLEX TO MG FOR LOW K       ED COURSE & MEDICAL DECISION MAKING:  Pertinent Labs & Imaging studies reviewed. (See chart for details)  On exam, the patient is afebrile and nontoxic appearing. She is hemodynamically stable and neurologically intact. Right forearm examination is significant for a 4 x 4 abscess with surrounding induration and cellulitis of most of the forearm extending from the wrist to just above the elbow. The patient was treated with Zofran, morphine and IV vancomycin. I suspect that the patient has an abscess with extensive cellulitis to the right forearm. I have a low suspicion for ischemic limb, septic arthritis, necrotizing fasciitis, sepsis or septic shock. . I recommended admission to the hospital for IV antibiotics and potential surgical drainage and the patient was agreeable with transfer to [facility]. I discussed the case with the hospitalist at [facility], Dr. Medina Sanchez, who accepted the patient in transfer. The patient is currently in stable condition awaiting a bed assignment and subsequent transport. Clinical Impression:  1. Abscess    2.  Cellulitis of right upper extremity          Comment: Please note this report has been produced using speech recognition software and may contain errors related to that system including errors in grammar, punctuation, and spelling, as

## 2022-04-04 NOTE — ED NOTES
Pt requesting to go outside. Informed her that is not possible at this time, she voiced understanding.      Isabel Hernandez RN  04/04/22 3816

## 2022-04-04 NOTE — ED NOTES
Pt presents to ED with c/o an abscess to her right forearm. Pt states she was seen once before for the same problem and placed on antibiotics. Pt states that prior to finishing her antibiotics the abscess had went away. Pt states today is her third day with out antibiotics and she now has another abscess in the same area.      Kb Shukla RN  04/04/22 0057

## 2022-04-05 ENCOUNTER — APPOINTMENT (OUTPATIENT)
Dept: CT IMAGING | Age: 42
DRG: 364 | End: 2022-04-05
Payer: COMMERCIAL

## 2022-04-05 PROBLEM — L02.413 ABSCESS OF RIGHT ARM: Status: ACTIVE | Noted: 2022-04-05

## 2022-04-05 LAB
ANION GAP SERPL CALCULATED.3IONS-SCNC: 11 MMOL/L (ref 4–16)
BASOPHILS ABSOLUTE: 0.1 K/CU MM
BASOPHILS RELATIVE PERCENT: 0.4 % (ref 0–1)
BUN BLDV-MCNC: 9 MG/DL (ref 6–23)
CALCIUM SERPL-MCNC: 8.7 MG/DL (ref 8.3–10.6)
CHLORIDE BLD-SCNC: 104 MMOL/L (ref 99–110)
CO2: 25 MMOL/L (ref 21–32)
CREAT SERPL-MCNC: 0.6 MG/DL (ref 0.6–1.1)
DIFFERENTIAL TYPE: ABNORMAL
EOSINOPHILS ABSOLUTE: 0.2 K/CU MM
EOSINOPHILS RELATIVE PERCENT: 2 % (ref 0–3)
GFR AFRICAN AMERICAN: >60 ML/MIN/1.73M2
GFR NON-AFRICAN AMERICAN: >60 ML/MIN/1.73M2
GLUCOSE BLD-MCNC: 99 MG/DL (ref 70–99)
HCT VFR BLD CALC: 40.5 % (ref 37–47)
HEMOGLOBIN: 13.2 GM/DL (ref 12.5–16)
IMMATURE NEUTROPHIL %: 0.4 % (ref 0–0.43)
INR BLD: 0.88 INDEX
LYMPHOCYTES ABSOLUTE: 2.9 K/CU MM
LYMPHOCYTES RELATIVE PERCENT: 25.3 % (ref 24–44)
MAGNESIUM: 1.7 MG/DL (ref 1.8–2.4)
MCH RBC QN AUTO: 31.7 PG (ref 27–31)
MCHC RBC AUTO-ENTMCNC: 32.6 % (ref 32–36)
MCV RBC AUTO: 97.1 FL (ref 78–100)
MONOCYTES ABSOLUTE: 0.7 K/CU MM
MONOCYTES RELATIVE PERCENT: 6.5 % (ref 0–4)
NUCLEATED RBC %: 0 %
PDW BLD-RTO: 13 % (ref 11.7–14.9)
PLATELET # BLD: 330 K/CU MM (ref 140–440)
PMV BLD AUTO: 9.1 FL (ref 7.5–11.1)
POTASSIUM SERPL-SCNC: 3.4 MMOL/L (ref 3.5–5.1)
PROTHROMBIN TIME: 11.4 SECONDS (ref 11.7–14.5)
RBC # BLD: 4.17 M/CU MM (ref 4.2–5.4)
SEGMENTED NEUTROPHILS ABSOLUTE COUNT: 7.4 K/CU MM
SEGMENTED NEUTROPHILS RELATIVE PERCENT: 65.4 % (ref 36–66)
SODIUM BLD-SCNC: 140 MMOL/L (ref 135–145)
TOTAL IMMATURE NEUTOROPHIL: 0.04 K/CU MM
TOTAL NUCLEATED RBC: 0 K/CU MM
WBC # BLD: 11.3 K/CU MM (ref 4–10.5)

## 2022-04-05 PROCEDURE — 36415 COLL VENOUS BLD VENIPUNCTURE: CPT

## 2022-04-05 PROCEDURE — 6360000004 HC RX CONTRAST MEDICATION: Performed by: INTERNAL MEDICINE

## 2022-04-05 PROCEDURE — 6370000000 HC RX 637 (ALT 250 FOR IP): Performed by: STUDENT IN AN ORGANIZED HEALTH CARE EDUCATION/TRAINING PROGRAM

## 2022-04-05 PROCEDURE — 94761 N-INVAS EAR/PLS OXIMETRY MLT: CPT

## 2022-04-05 PROCEDURE — 1200000000 HC SEMI PRIVATE

## 2022-04-05 PROCEDURE — 85025 COMPLETE CBC W/AUTO DIFF WBC: CPT

## 2022-04-05 PROCEDURE — 99253 IP/OBS CNSLTJ NEW/EST LOW 45: CPT | Performed by: SURGERY

## 2022-04-05 PROCEDURE — 6370000000 HC RX 637 (ALT 250 FOR IP): Performed by: INTERNAL MEDICINE

## 2022-04-05 PROCEDURE — 87081 CULTURE SCREEN ONLY: CPT

## 2022-04-05 PROCEDURE — 2580000003 HC RX 258: Performed by: STUDENT IN AN ORGANIZED HEALTH CARE EDUCATION/TRAINING PROGRAM

## 2022-04-05 PROCEDURE — 73201 CT UPPER EXTREMITY W/DYE: CPT

## 2022-04-05 PROCEDURE — 6360000002 HC RX W HCPCS: Performed by: STUDENT IN AN ORGANIZED HEALTH CARE EDUCATION/TRAINING PROGRAM

## 2022-04-05 PROCEDURE — 80048 BASIC METABOLIC PNL TOTAL CA: CPT

## 2022-04-05 PROCEDURE — 85610 PROTHROMBIN TIME: CPT

## 2022-04-05 PROCEDURE — 83735 ASSAY OF MAGNESIUM: CPT

## 2022-04-05 RX ORDER — MAGNESIUM OXIDE 400 MG/1
400 TABLET ORAL 2 TIMES DAILY
Status: DISCONTINUED | OUTPATIENT
Start: 2022-04-05 | End: 2022-04-08 | Stop reason: HOSPADM

## 2022-04-05 RX ORDER — POTASSIUM CHLORIDE 20 MEQ/1
40 TABLET, EXTENDED RELEASE ORAL ONCE
Status: COMPLETED | OUTPATIENT
Start: 2022-04-05 | End: 2022-04-05

## 2022-04-05 RX ORDER — 0.9 % SODIUM CHLORIDE 0.9 %
10 VIAL (ML) INJECTION
Status: COMPLETED | OUTPATIENT
Start: 2022-04-05 | End: 2022-04-05

## 2022-04-05 RX ORDER — NICOTINE 21 MG/24HR
1 PATCH, TRANSDERMAL 24 HOURS TRANSDERMAL DAILY
Status: DISCONTINUED | OUTPATIENT
Start: 2022-04-05 | End: 2022-04-08 | Stop reason: HOSPADM

## 2022-04-05 RX ADMIN — SODIUM CHLORIDE, PRESERVATIVE FREE 10 ML: 5 INJECTION INTRAVENOUS at 21:17

## 2022-04-05 RX ADMIN — BUPRENORPHINE HYDROCHLORIDE, NALOXONE HYDROCHLORIDE 1 FILM: 8; 2 FILM, SOLUBLE BUCCAL; SUBLINGUAL at 09:39

## 2022-04-05 RX ADMIN — VANCOMYCIN HYDROCHLORIDE 1250 MG: 5 INJECTION, POWDER, LYOPHILIZED, FOR SOLUTION INTRAVENOUS at 05:30

## 2022-04-05 RX ADMIN — POTASSIUM CHLORIDE 40 MEQ: 20 TABLET, EXTENDED RELEASE ORAL at 14:08

## 2022-04-05 RX ADMIN — MAGNESIUM OXIDE 400 MG (241.3 MG MAGNESIUM) TABLET 400 MG: TABLET at 17:19

## 2022-04-05 RX ADMIN — SODIUM CHLORIDE, PRESERVATIVE FREE 10 ML: 5 INJECTION INTRAVENOUS at 09:40

## 2022-04-05 RX ADMIN — CETIRIZINE HYDROCHLORIDE 10 MG: 10 TABLET, FILM COATED ORAL at 09:38

## 2022-04-05 RX ADMIN — ARIPIPRAZOLE 2 MG: 2 TABLET ORAL at 21:15

## 2022-04-05 RX ADMIN — PANTOPRAZOLE SODIUM 40 MG: 40 TABLET, DELAYED RELEASE ORAL at 09:38

## 2022-04-05 RX ADMIN — LAMOTRIGINE 100 MG: 100 TABLET ORAL at 21:14

## 2022-04-05 RX ADMIN — GABAPENTIN 800 MG: 400 CAPSULE ORAL at 21:14

## 2022-04-05 RX ADMIN — GABAPENTIN 800 MG: 400 CAPSULE ORAL at 09:38

## 2022-04-05 RX ADMIN — VANCOMYCIN HYDROCHLORIDE 1250 MG: 5 INJECTION, POWDER, LYOPHILIZED, FOR SOLUTION INTRAVENOUS at 18:38

## 2022-04-05 RX ADMIN — ENOXAPARIN SODIUM 40 MG: 100 INJECTION SUBCUTANEOUS at 09:38

## 2022-04-05 RX ADMIN — GABAPENTIN 800 MG: 400 CAPSULE ORAL at 14:08

## 2022-04-05 RX ADMIN — IOPAMIDOL 75 ML: 755 INJECTION, SOLUTION INTRAVENOUS at 09:16

## 2022-04-05 RX ADMIN — LAMOTRIGINE 100 MG: 100 TABLET ORAL at 09:38

## 2022-04-05 RX ADMIN — PIPERACILLIN SODIUM AND TAZOBACTAM SODIUM 3375 MG: 3; 375 INJECTION, POWDER, FOR SOLUTION INTRAVENOUS at 23:25

## 2022-04-05 RX ADMIN — MAGNESIUM OXIDE 400 MG (241.3 MG MAGNESIUM) TABLET 400 MG: TABLET at 21:14

## 2022-04-05 RX ADMIN — PIPERACILLIN SODIUM AND TAZOBACTAM SODIUM 3375 MG: 3; 375 INJECTION, POWDER, FOR SOLUTION INTRAVENOUS at 07:20

## 2022-04-05 RX ADMIN — PIPERACILLIN SODIUM AND TAZOBACTAM SODIUM 3375 MG: 3; 375 INJECTION, POWDER, FOR SOLUTION INTRAVENOUS at 14:13

## 2022-04-05 RX ADMIN — Medication 10 ML: at 09:17

## 2022-04-05 ASSESSMENT — PAIN SCALES - GENERAL: PAINLEVEL_OUTOF10: 7

## 2022-04-05 NOTE — PLAN OF CARE
Problem: Infection:  Goal: Will remain free from infection  Description: Will remain free from infection  Outcome: Ongoing     Problem: Infection:  Goal: Will remain free from infection  Description: Will remain free from infection  Outcome: Ongoing     Problem: Safety:  Goal: Free from accidental physical injury  Description: Free from accidental physical injury  Outcome: Ongoing  Goal: Free from intentional harm  Description: Free from intentional harm  Outcome: Ongoing     Problem: Daily Care:  Goal: Daily care needs are met  Description: Daily care needs are met  Outcome: Ongoing

## 2022-04-05 NOTE — PROGRESS NOTES
V2.0  [facility]      Name:  Blanca Weinberg /Age/Sex: 1980  (42 y.o. female)   MRN & CSN:  [id number] & [id number] Encounter Date/Time: 2022 2:16 PM EDT    Location:  [id number] PCP: No primary care provider on file. Hospital Day: 2    Assessment and Plan:   Blanca Weinberg is a 42 y.o. female with pmh of IV drug abuse who presents with right forearm painful swelling. 1.  Right forearm cellulitis: This is due to IV drug abuse. Continue IV vancomycin and Zosyn. CT elbow reveals cellulitis, no abscess. Surgery consult. 2.  Hypokalemia/hypomagnesemia: Replace and monitor. 3.  IV drug abuse: Recently used crack cocaine. On Suboxone. 4.  Chronic hepatitis C: Not a candidate for treatment while she is still an active drug user. 5.  Bipolar disorder: Continue home medications. 6.  Smoking: Smoking cessation counseling given. Nicotine patch offered. DVT prophylaxis: Lovenox  Disposition: She lives alone. She does not need home health care services. Diet ADULT DIET; Regular   DVT Prophylaxis [x] Lovenox, []  Heparin, [] SCDs, [] Ambulation,  [] Eliquis, [] Xarelto  [] Coumadin   Code Status Full Code   Disposition From: Home  Expected Disposition: Home  Estimated Date of Discharge: 2 to 3 days  Patient requires continued admission due to right forearm cellulitis   Surrogate Decision Maker/ POA      Subjective:     Chief Complaint: Abscess       Blanca Weinberg is a 42 y.o. female who presents with right forearm painful swelling. No fever or chills this morning. Review of Systems:    Review of Systems    Nothing significant. Objective:        Intake/Output Summary (Last 24 hours) at 2022 1416  Last data filed at 2022 1910  Gross per 24 hour   Intake 250 ml   Output --   Net 250 ml        Vitals:   Vitals:    22 0830   BP: (!) 110/52   Pulse: 77   Resp: 16   Temp: 97.4 °F (36.3 °C)   SpO2:        Physical Exam: General: No apparent respiratory distress. Eyes: EOMI. Not pale. Anicteric. ENT: neck supple  Cardiovascular: Regular rate. Respiratory: Clear to auscultation  Gastrointestinal: Soft, non tender, no palpable mass  Genitourinary: no suprapubic tenderness  Musculoskeletal: Right forearm-erythematous swelling on the medial surface of antecubital area close to the elbow. Skin: warm, dry  Neuro: Alert. Oriented x3. No gross focal neurological deficit. Psych: Mood appropriate.      Medications:   Medications:    vancomycin  1,250 mg IntraVENous Q12H    nicotine  1 patch TransDERmal Daily    ARIPiprazole  2 mg Oral Nightly    buprenorphine-naloxone  2 Film SubLINGual Daily    cetirizine  10 mg Oral Daily    gabapentin  800 mg Oral TID    lamoTRIgine  100 mg Oral BID    pantoprazole  40 mg Oral QAM AC    sodium chloride flush  5-40 mL IntraVENous 2 times per day    enoxaparin  40 mg SubCUTAneous Daily    piperacillin-tazobactam  3,375 mg IntraVENous Q8H      Infusions:    sodium chloride       PRN Meds: sodium chloride flush, 5-40 mL, PRN  sodium chloride, , PRN  ondansetron, 4 mg, Q8H PRN   Or  ondansetron, 4 mg, Q6H PRN  polyethylene glycol, 17 g, Daily PRN  ibuprofen, 400 mg, Q6H PRN        Labs      Recent Results (from the past 24 hour(s))   CBC with Auto Differential    Collection Time: 04/04/22  4:43 PM   Result Value Ref [place]    WBC 7.3 4.0 - 10.5 K/CU MM    RBC 4.31 4.2 - 5.4 M/CU MM    Hemoglobin 13.8 12.5 - 16.0 GM/DL    Hematocrit 41.9 37 - 47 %    MCV 97.2 78 - 100 FL    MCH 32.0 (H) 27 - 31 PG    MCHC 32.9 32.0 - 36.0 %    RDW 13.0 11.7 - 14.9 %    Platelets 227 235 - 493 K/CU MM    MPV 9.2 7.5 - 11.1 FL    Differential Type AUTOMATED DIFFERENTIAL     Segs Relative 60.5 36 - 66 %    Lymphocytes % 28.5 24 - 44 %    Monocytes % 7.9 (H) 0 - 4 %    Eosinophils % 2.2 0 - 3 %    Basophils % 0.5 0 - 1 %    Segs Absolute 4.4 K/CU MM    Lymphocytes Absolute 2.1 K/CU MM    Monocytes Absolute 0.6 %    Basophils % 0.4 0 - 1 %    Segs Absolute 7.4 K/CU MM    Lymphocytes Absolute 2.9 K/CU MM    Monocytes Absolute 0.7 K/CU MM    Eosinophils Absolute 0.2 K/CU MM    Basophils Absolute 0.1 K/CU MM    Nucleated RBC % 0.0 %    Total Nucleated RBC 0.0 K/CU MM    Total Immature Neutrophil 0.04 K/CU MM    Immature Neutrophil % 0.4 0 - 0.43 %   Protime-INR    Collection Time: 04/05/22  8:33 AM   Result Value Ref [place]    Protime 11.4 (L) 11.7 - 14.5 SECONDS    INR 0.88 INDEX   Magnesium    Collection Time: 04/05/22  8:33 AM   Result Value Ref [place]    Magnesium 1.7 (L) 1.8 - 2.4 mg/dl        Imaging/Diagnostics Last 24 Hours   CT ELBOW RIGHT W CONTRAST    Result Date: 4/5/2022  EXAMINATION: CT OF THE RIGHT ELBOW WITH CONTRAST 4/5/2022 9:16 am TECHNIQUE: CT of the right elbow was performed with the administration of intravenous contrast.  Multiplanar reformatted images are provided for review. Dose modulation, iterative reconstruction, and/or weight based adjustment of the mA/kV was utilized to reduce the radiation dose to as low as reasonably achievable. COMPARISON: None. HISTORY ORDERING SYSTEM PROVIDED HISTORY: abscess near joint. please scan above abd below TECHNOLOGIST PROVIDED HISTORY: Reason for exam:->abscess near joint. please scan above abd below Reason for Exam: abscess near joint. please scan above abd below Additional signs and symptoms: UNABLE TO COMPLETELY STRAIGHTEN ARM DUE TO PAIN Relevant Medical/Surgical History: 75 ML ISOVUE 370/ GFR>60 FINDINGS: There is a paucity of subcutaneous fat along the antecubital fossa which may be postsurgical. There is skin thickening and subcutaneous edema involving the volar and radial aspect of the forearm. A defined fluid collection is not identified. No soft tissue gas is identified. An acute osseous abnormality is not appreciated. There is no joint effusion.      Skin thickening and subcutaneous stranding involving the volar and ulnar aspect of the forearm, most likely representing cellulitis. No abscess or soft tissue gas.        Electronically signed by Loida Stout MD on 4/5/2022 at 2:16 PM

## 2022-04-05 NOTE — ED NOTES
[address] [facility] arrived for transport.  Paperwork and report given     Nathalia Franklin RN  04/04/22 [id number]

## 2022-04-05 NOTE — CARE COORDINATION
This RN CM to bedside to initiate DC planning. Pt asleep . CM will revisit. 80- This RN CM to bedside to introduce self and initiate DC planning. Pt lives at home w/ family. She does not have any HHC or passport services. She does not use any DME. I did speak with pt about resources for IVDA history. She declined. She states she has been clean since 2012, but had a one time lapse in judgement when around someone with access to IVD, she used and this is why she ended up with her arm red and swollen. She states she does not need any rehab resources at this time. She denies any further DC needs. CM will continue to follow. If pt were to need home IV antibiotics she would need SNF or Infusion clinic.

## 2022-04-05 NOTE — H&P
History and Physical      Name:  Mercedez Gottlieb /Age/Sex: 1980  (42 y.o. female)   MRN & CSN:  [id number] & [id number] Encounter Date/Time: 2022 10:41 PM EDT   Location:  [address] [place] [address] PCP: No primary care provider on file. Hospital Day: 1    Assessment and Plan:   Mercedez Gottlieb is a 42 y.o. female with a pmh of IVDA, hepatitis C, anxiety, migraines, hemorrhoids, tobacco abuse, GERD, and obesity,  who presents with Cellulitis of right forearm    Hospital Problems           Last Modified POA    * (Principal) Cellulitis of right forearm 2022 Yes        1. Cellulitis with abscess of right proximal forearm  2. IV drug abuse  Admit to inpatient services  CT of right elbow given proximity to the joint  Blood cultures were obtained at OSH ED  We will get a MRSA screen  Continue vancomycin and Zosyn with pharmacy to dose vancomycin  1. Consult general surgery, appreciate recommendations  2. Pain control with IV morphine  3. Antiemetics with Zofran    Other chronic medical conditions:   Anxiety  Migraines  Hemorrhoids  Hepatitis C  Tobacco abuse  GERD  Obesity    Diet ADULT DIET; Regular   DVT Prophylaxis [x] Lovenox, []  Heparin, [] SCDs, [] Ambulation,  [] Eliquis, [] Xarelto   Code Status Full Code     History from:     patient    History of Present Illness:     Chief Complaint: Cellulitis of right forearm  Mercedez Gottlieb is a 42 y.o. female with pmh of IVDA, hepatitis C, anxiety, migraines, hemorrhoids, tobacco abuse, GERD, and obesity, who presents with pain and swelling in her right forearm. Patient has a history of IVDA and recently relapsed 3 weeks ago and shot up into her right forearm. Patient states she missed her vein and noted approximately 4 days later that the lump got bigger. She waited another week before she went to the ED. Patient states she was given antibiotics and took all of her antibiotics which with a 10-day course of Bactrim and Keflex.   She states immediately after completion of antibiotics she noticed worsening erythema, warmth, and pain to that area. This has been developing over the last 3 days. She states she has not seen any drainage from the area. She states pain is a 7/10 on the pain scale exacerbated by movement or touch and better with keeping still. Patient does endorse decreased range of motion of that elbow. Patient states she has occasional fevers and chills, epigastric abdominal pain with nausea but without emesis. Patient endorses malaise and fatigue. Otherwise patient denies headache, chest pain, shortness of breath, cough, diarrhea, melena, hematochezia, dysuria, frequency, or urgency. Review of Systems:   Review of Systems   Constitutional: Positive for appetite change, chills, fatigue and fever. HENT: Negative for congestion, rhinorrhea and sore throat. Eyes: Negative for visual disturbance. Respiratory: Negative for cough, chest tightness, shortness of breath and wheezing. Cardiovascular: Negative for chest pain and palpitations. Gastrointestinal: Positive for abdominal pain and nausea. Negative for anal bleeding, blood in stool, diarrhea and vomiting. Genitourinary: Negative for dysuria, frequency, hematuria and urgency. Musculoskeletal: Positive for arthralgias and myalgias. Negative for back pain. Skin: Positive for wound. Negative for color change, pallor and rash. Neurological: Negative for dizziness, seizures, syncope, speech difficulty, weakness, numbness and headaches. Psychiatric/Behavioral: Negative for confusion. Objective:        Intake/Output Summary (Last 24 hours) at 4/4/2022 2241  Last data filed at 4/4/2022 1910  Gross per 24 hour   Intake 250 ml   Output --   Net 250 ml      Vitals:   Vitals:    04/04/22 1834 04/04/22 2111 04/04/22 2218 04/04/22 2230   BP: 120/85 125/79 118/78    Pulse: 91 94 96    Resp: 15 18 17    Temp: 97.8 °F (36.6 °C)  98.2 °F (36.8 °C)    TempSrc: Infrared  Oral    SpO2: 99% 98% 98%    Weight:   140 lb (63.5 kg) 140 lb (63.5 kg)   Height:   5' 2\" (1.575 m) 5' 2\" (1.575 m)       Medications Prior to Admission     Prior to Admission medications    Medication Sig Start Date End Date Taking? Authorizing Provider   ARIPiprazole (ABILIFY) 2 MG tablet take 1 tablet by mouth at bedtime 1/12/22   Historical Provider, MD   buprenorphine-naloxone (SUBOXONE) 8-2 MG FILM SL film dissolve 2 FILMS under the tongue once daily 3/15/22   Historical Provider, MD   cetirizine (ZYRTEC) 10 MG tablet Take 10 mg by mouth    Historical Provider, MD   pantoprazole (PROTONIX) 40 MG tablet Take 40 mg by mouth    Historical Provider, MD   lamoTRIgine (LAMICTAL) 100 MG tablet take 1 tablet by mouth twice a day 1/12/22   Historical Provider, MD   gabapentin (NEURONTIN) 400 MG capsule Take 800 mg by mouth 3 times daily. Historical Provider, MD   ibuprofen (ADVIL;MOTRIN) 600 MG tablet Take 1 tablet by mouth 3 times daily as needed for Pain 3/16/22   Daniel Moreno MD       Physical Exam:    Physical Exam    Past Medical History:   PMHx   Past Medical History:   Diagnosis Date    Anxiety     Colon polyp 4-7-2015    Hemorrhoids 4-7-2015    Hepatitis C     IV drug abuse ([facility] [address]     Migraines      PSHX:  has a past surgical history that includes Tonsillectomy; Adenoidectomy; Cholecystectomy; Tubal ligation; and Colonoscopy (04/07/2015). Allergies: No Known Allergies  Fam HX: Reviewed family history includes No Known Problems in her mother; Stroke in her father.   Soc HX:   Social History     Socioeconomic History    Marital status:      Spouse name: None    Number of children: None    Years of education: None    Highest education level: None   Occupational History    None   Tobacco Use    Smoking status: Current Every Day Smoker     Packs/day: 1.00     Types: Cigarettes    Smokeless tobacco: Never Used   Substance and Sexual Activity    Alcohol use: No    Drug use: Yes     Types: IV, Methamphetamines (Crystal Meth)     Comment: heroin    Sexual activity: Yes     Partners: Male   Other Topics Concern    None   Social History Narrative    None     Social Determinants of Health     Financial Resource Strain:     Difficulty of Paying Living Expenses: Not on file   Food Insecurity:     Worried About Running Out of Food in the Last Year: Not on file    Louise of Food in the Last Year: Not on file   Transportation Needs:     Lack of Transportation (Medical): Not on file    Lack of Transportation (Non-Medical):  Not on file   Physical Activity:     Days of Exercise per Week: Not on file    Minutes of Exercise per Session: Not on file   Stress:     Feeling of Stress : Not on file   Social Connections:     Frequency of Communication with Friends and Family: Not on file    Frequency of Social Gatherings with Friends and Family: Not on file    Attends Church Services: Not on file    Active Member of [address] [place] [address] [employer] or Organizations: Not on file    Attends Club or Organization Meetings: Not on file    Marital Status: Not on file   Intimate Partner Violence:     Fear of Current or Ex-Partner: Not on file    Emotionally Abused: Not on file    Physically Abused: Not on file    Sexually Abused: Not on file   Housing Stability:     Unable to Pay for Housing in the Last Year: Not on file    Number of [place] in the Last Year: Not on file    Unstable Housing in the Last Year: Not on file       Medications:   Medications:    ARIPiprazole  2 mg Oral Nightly    [START ON 4/5/2022] buprenorphine-naloxone  2 Film SubLINGual Daily    [START ON 4/5/2022] cetirizine  10 mg Oral Daily    gabapentin  800 mg Oral TID    lamoTRIgine  100 mg Oral BID    [START ON 4/5/2022] pantoprazole  40 mg Oral QAM AC    sodium chloride flush  5-40 mL IntraVENous 2 times per day    [START ON 4/5/2022] enoxaparin  40 mg SubCUTAneous Daily    piperacillin-tazobactam  3,375 mg IntraVENous Q8H      Infusions:    Priya Cordova DO on 4/4/2022 at 10:41 PM

## 2022-04-05 NOTE — PROGRESS NOTES
Pt arrived from on Ed via stretcher. Pt ambulated to bed. A&O, orientated to room and call light system. Call light w/i reach.  No needs voiced at this time

## 2022-04-05 NOTE — CONSULTS
Department of General Surgery   Surgical Service Dr. Ybarra Person   Consult Note    Date of Consult: 4/5/22    Reason for Consult:  Right forearm induration and cellulitis  Requesting Physician:  Dr. Frank Zafar:  Right forearm swelling, pain    History Obtained From:  patient    HISTORY OF PRESENT ILLNESS:    The patient is a 42 y.o. female who presented with a few days of worsening right forearm pain and swelling. She reports recent relapse into IV drug use. She denies fevers or chills. She reports some improvement in redness and pain since being starting on IV antibiotics. CT was performed showing induration but no abscess. She reports one other abscess in the past due to IV drug use. Denies other medical problems. No blood thinners. Denies other complaints.       Past Medical History:    Past Medical History:   Diagnosis Date    Anxiety     Colon polyp 4-7-2015    Hemorrhoids 4-7-2015    Hepatitis C     IV drug abuse ([facility] [address]     Migraines        Past Surgical History:    Past Surgical History:   Procedure Laterality Date    ADENOIDECTOMY      CHOLECYSTECTOMY      COLONOSCOPY  04/07/2015    Polyp cecum, hemorrhoids    TONSILLECTOMY      TUBAL LIGATION         Current Medications:   Current Facility-Administered Medications   Medication Dose Route Frequency Provider Last Rate Last Admin    vancomycin (VANCOCIN) 1250 mg in dextrose 5 % 250 mL IVPB  1,250 mg IntraVENous Q12H Kemal Rosario DO   Stopped at 04/05/22 0700    nicotine (NICODERM CQ) 14 MG/24HR 1 patch  1 patch TransDERmal Daily Emir Wiggins MD   1 patch at 04/05/22 1408    magnesium oxide (MAG-OX) tablet 400 mg  400 mg Oral BID Emir Wiggins MD        ARIPiprazole (ABILIFY) tablet 2 mg  2 mg Oral Nightly Kemal Rosario DO   2 mg at 04/04/22 2320    buprenorphine-naloxone (SUBOXONE) 8-2 MG SL film 2 Film  2 Film SubLINGual Daily Armin Barrier, DO   1 Film at 04/05/22 0939    cetirizine (ZYRTEC) tablet 10 mg  10 mg Oral Daily Petejessy Ferrer, DO   10 mg at 04/05/22 4181    gabapentin (NEURONTIN) capsule 800 mg  800 mg Oral TID Petejessy Ferrer, DO   800 mg at 04/05/22 1408    lamoTRIgine (LAMICTAL) tablet 100 mg  100 mg Oral BID Petejessy Ferrer, DO   100 mg at 04/05/22 8867    pantoprazole (PROTONIX) tablet 40 mg  40 mg Oral QAM AC Kemal Rosario, DO   40 mg at 04/05/22 7667    sodium chloride flush 0.9 % injection 5-40 mL  5-40 mL IntraVENous 2 times per day Pete Ferrer, DO   10 mL at 04/05/22 0940    sodium chloride flush 0.9 % injection 5-40 mL  5-40 mL IntraVENous PRN Pete Ferrer, DO        0.9 % sodium chloride infusion   IntraVENous PRN Pete Ferrer DO        enoxaparin (LOVENOX) injection 40 mg  40 mg SubCUTAneous Daily Petejessy Ferrer DO   40 mg at 04/05/22 3342    ondansetron (ZOFRAN-ODT) disintegrating tablet 4 mg  4 mg Oral Q8H PRN Pete Ferrer DO        Or    ondansetron (ZOFRAN) injection 4 mg  4 mg IntraVENous Q6H PRN Pete Ferrer,         polyethylene glycol (GLYCOLAX) packet 17 g  17 g Oral Daily PRN Pete Ferrer DO        piperacillin-tazobactam (ZOSYN) 3,375 mg in dextrose 5 % 50 mL IVPB extended infusion (mini-bag)  3,375 mg IntraVENous Q8H [facility], DO 12.5 mL/hr at 04/05/22 1413 3,375 mg at 04/05/22 1413    ibuprofen (ADVIL;MOTRIN) tablet 400 mg  400 mg Oral Q6H PRN Pete Ferrer, DO   400 mg at 04/04/22 2331       Allergies:  Patient has no known allergies.     Social History:   Social History     Socioeconomic History    Marital status:      Spouse name: None    Number of children: None    Years of education: None    Highest education level: None   Occupational History    None   Tobacco Use    Smoking status: Current Every Day Smoker     Packs/day: 0.50     Types: Cigarettes    Smokeless tobacco: Never Used   Substance and Sexual Activity    Alcohol use: No    Drug use: Yes     Types: IV, Methamphetamines (Crystal Meth)     Comment: heroin    Sexual activity: Yes     Partners: Male   Other Topics Concern    None   Social History Narrative    None     Social Determinants of Health     Financial Resource Strain:     Difficulty of Paying Living Expenses: Not on file   Food Insecurity:     Worried About Running Out of Food in the Last Year: Not on file    Louise of Food in the Last Year: Not on file   Transportation Needs:     Lack of Transportation (Medical): Not on file    Lack of Transportation (Non-Medical): Not on file   Physical Activity:     Days of Exercise per Week: Not on file    Minutes of Exercise per Session: Not on file   Stress:     Feeling of Stress : Not on file   Social Connections:     Frequency of Communication with Friends and Family: Not on file    Frequency of Social Gatherings with Friends and Family: Not on file    Attends Denominational Services: Not on file    Active Member of [address] [place] [address] [employer] or Organizations: Not on file    Attends Club or Organization Meetings: Not on file    Marital Status: Not on file   Intimate Partner Violence:     Fear of Current or Ex-Partner: Not on file    Emotionally Abused: Not on file    Physically Abused: Not on file    Sexually Abused: Not on file   Housing Stability:     Unable to Pay for Housing in the Last Year: Not on file    Number of [place] in the Last Year: Not on file    Unstable Housing in the Last Year: Not on file       Family History:   Family History   Problem Relation Age of Onset    No Known Problems Mother     Stroke Father        REVIEW OF SYSTEMS:    Constitutional: Negative for chills. Negative for fever. HENT: Negative for congestion. Negative for rhinorrhea. Respiratory: Negative for cough. Negative for shortness of breath. Negative for wheezing. Cardiovascular: Negative for chest pain. Gastrointestinal:  Negative for constipation. Negative for diarrhea. Negative for nausea and vomiting. Genitourinary: Negative for difficulty urinating.    Neurological: Negative for dizziness, syncope and numbness. Hematological: Does not bruise/bleed easily. PHYSICAL EXAM:  Vitals:    04/04/22 2230 04/04/22 2252 04/05/22 0830 04/05/22 1445   BP:  118/78 (!) 110/52 130/68   Pulse:  96 77 72   Resp:   16 17   Temp:   97.4 °F (36.3 °C) 96.1 °F (35.6 °C)   TempSrc:   Oral Oral   SpO2:    91%   Weight: 140 lb (63.5 kg)      Height: 5' 2\" (1.575 m)          Physical Exam  General: awake, alert, in no acute distress  HEENT: mucous membranes moist  Respiratory: normal effort, no wheezes appreciated  CV: appears well perfused  Abdomen: Soft, non-tender, non-distended. Skin: warm and dry    Extremities: right forearm with 2x3cm area of induration, mildly ballotable--no obvious fluctuance, no drainage, mild pain    Neuro: no focal deficits noted  Psych: mood normal        DATA:    Lab Results   Component Value Date    WBC 11.3 (H) 04/05/2022    HGB 13.2 04/05/2022    HCT 40.5 04/05/2022    MCV 97.1 04/05/2022     04/05/2022     Lab Results   Component Value Date     04/05/2022    K 3.4 04/05/2022     04/05/2022    CO2 25 04/05/2022    BUN 9 04/05/2022    CREATININE 0.6 04/05/2022    GLUCOSE 99 04/05/2022    CALCIUM 8.7 04/05/2022          IMPRESSION:    42 y.o. female with right forearm cellulitis. Possible developing abscess.     Patient Active Problem List:     Cellulitis and abscess of upper arm and forearm     Cellulitis of right forearm     Abscess of right arm        PLAN:  - continue antibiotics  - will follow, possible I&D at bedside tomorrow if any worsening        Electronically signed by Azul Paul MD on 4/5/2022 at 3:21 PM

## 2022-04-05 NOTE — H&P
Patient seen and examined before midnight. This is a patient with IVDA with long history of cessation with recent remission. Patient used crack cocaine in her right forearm and stated she missed the vein. Patient completed outpatient antibiotics which initially improved but patient noted once completion of therapy she developed 2 areas of abscesses just distal to her right elbow. Patient is septic and we will complete a septic work-up and start patient on broad-spectrum antibiotics. Given proximity to elbow and pain with movement we will obtain a CT elbow right without contrast.  General surgery will be consulted. Full H&P to follow.     Electronically signed by [facility] on 4/5/2022 at 6:52 AM

## 2022-04-05 NOTE — PROGRESS NOTES
[facility]  consulted by Dr. Almita Mcdaniel for monitoring and adjustment. Indication for treatment: Cellulitis of right forearm  Goal trough: [x] 10-15 mcg/mL or [] 15-20 mcg/ml  AUC/AKUA: [x] <500 or [] 400-600    Pertinent Laboratory Values:   Temp Readings from Last 3 Encounters:   04/04/22 98.2 °F (36.8 °C) (Oral)   03/16/22 98 °F (36.7 °C) (Oral)   12/25/19 98.2 °F (36.8 °C) (Oral)     Recent Labs     04/04/22  1643 04/04/22  1705   WBC 7.3  --    LACTATE  --  1.4     Recent Labs     04/04/22  1643   BUN 7   CREATININE 0.5*     Estimated Creatinine Clearance: 128 mL/min (A) (based on SCr of 0.5 mg/dL (L)). Intake/Output Summary (Last 24 hours) at 4/5/2022 0033  Last data filed at 4/4/2022 1910  Gross per 24 hour   Intake 250 ml   Output --   Net 250 ml       Pertinent Cultures:  Date    Source    Results  4/04   Blood x 2   Ordered  4/04   MRSA nasal screen  Ordered    Vancomycin level:   TROUGH:  No results for input(s): VANCOTROUGH in the last 72 hours. RANDOM:  No results for input(s): VANCORANDOM in the last 72 hours. Assessment:  SCr, BUN, and urine output: Scr = 0.5, no output data  Day(s) of therapy: 1  Vancomycin concentration: Pending    Plan:  Vancomycin 1250 mg IVPB q12h  Predicted trough of 12.7 and AUC: 472 at steady-state  Pharmacy will continue to monitor patient and adjust therapy as indicated    VANCOMYCIN CONCENTRATION SCHEDULED FOR 4/06/22 @0530    Thank you for the consult.   Carlos Esparza, [facility]  4/5/2022 12:33 AM

## 2022-04-06 LAB
ANION GAP SERPL CALCULATED.3IONS-SCNC: 10 MMOL/L (ref 4–16)
BUN BLDV-MCNC: 5 MG/DL (ref 6–23)
CALCIUM SERPL-MCNC: 8.3 MG/DL (ref 8.3–10.6)
CHLORIDE BLD-SCNC: 106 MMOL/L (ref 99–110)
CO2: 23 MMOL/L (ref 21–32)
CREAT SERPL-MCNC: 0.6 MG/DL (ref 0.6–1.1)
DOSE AMOUNT: ABNORMAL
DOSE TIME: ABNORMAL
GFR AFRICAN AMERICAN: >60 ML/MIN/1.73M2
GFR NON-AFRICAN AMERICAN: >60 ML/MIN/1.73M2
GLUCOSE BLD-MCNC: 102 MG/DL (ref 70–99)
HCT VFR BLD CALC: 37.7 % (ref 37–47)
HEMOGLOBIN: 12.5 GM/DL (ref 12.5–16)
MCH RBC QN AUTO: 32.2 PG (ref 27–31)
MCHC RBC AUTO-ENTMCNC: 33.2 % (ref 32–36)
MCV RBC AUTO: 97.2 FL (ref 78–100)
PDW BLD-RTO: 13 % (ref 11.7–14.9)
PLATELET # BLD: 340 K/CU MM (ref 140–440)
PMV BLD AUTO: 9.1 FL (ref 7.5–11.1)
POTASSIUM SERPL-SCNC: 3.9 MMOL/L (ref 3.5–5.1)
RBC # BLD: 3.88 M/CU MM (ref 4.2–5.4)
SODIUM BLD-SCNC: 139 MMOL/L (ref 135–145)
VANCOMYCIN TROUGH: 24 UG/ML (ref 10–20)
WBC # BLD: 7.3 K/CU MM (ref 4–10.5)

## 2022-04-06 PROCEDURE — 6360000002 HC RX W HCPCS: Performed by: STUDENT IN AN ORGANIZED HEALTH CARE EDUCATION/TRAINING PROGRAM

## 2022-04-06 PROCEDURE — 36415 COLL VENOUS BLD VENIPUNCTURE: CPT

## 2022-04-06 PROCEDURE — 6370000000 HC RX 637 (ALT 250 FOR IP): Performed by: STUDENT IN AN ORGANIZED HEALTH CARE EDUCATION/TRAINING PROGRAM

## 2022-04-06 PROCEDURE — 80048 BASIC METABOLIC PNL TOTAL CA: CPT

## 2022-04-06 PROCEDURE — 87070 CULTURE OTHR SPECIMN AEROBIC: CPT

## 2022-04-06 PROCEDURE — 94761 N-INVAS EAR/PLS OXIMETRY MLT: CPT

## 2022-04-06 PROCEDURE — 10061 I&D ABSCESS COMP/MULTIPLE: CPT | Performed by: SURGERY

## 2022-04-06 PROCEDURE — 2500000003 HC RX 250 WO HCPCS: Performed by: SURGERY

## 2022-04-06 PROCEDURE — 87075 CULTR BACTERIA EXCEPT BLOOD: CPT

## 2022-04-06 PROCEDURE — 85027 COMPLETE CBC AUTOMATED: CPT

## 2022-04-06 PROCEDURE — 0J9G0ZZ DRAINAGE OF RIGHT LOWER ARM SUBCUTANEOUS TISSUE AND FASCIA, OPEN APPROACH: ICD-10-PCS | Performed by: SURGERY

## 2022-04-06 PROCEDURE — 1200000000 HC SEMI PRIVATE

## 2022-04-06 PROCEDURE — 87076 CULTURE ANAEROBE IDENT EACH: CPT

## 2022-04-06 PROCEDURE — 6370000000 HC RX 637 (ALT 250 FOR IP): Performed by: INTERNAL MEDICINE

## 2022-04-06 PROCEDURE — 80202 ASSAY OF VANCOMYCIN: CPT

## 2022-04-06 PROCEDURE — 2580000003 HC RX 258: Performed by: STUDENT IN AN ORGANIZED HEALTH CARE EDUCATION/TRAINING PROGRAM

## 2022-04-06 RX ORDER — BUPRENORPHINE AND NALOXONE 8; 2 MG/1; MG/1
1 FILM, SOLUBLE BUCCAL; SUBLINGUAL 2 TIMES DAILY
Status: DISCONTINUED | OUTPATIENT
Start: 2022-04-06 | End: 2022-04-08 | Stop reason: HOSPADM

## 2022-04-06 RX ORDER — LIDOCAINE HYDROCHLORIDE AND EPINEPHRINE BITARTRATE 20; .01 MG/ML; MG/ML
20 INJECTION, SOLUTION SUBCUTANEOUS ONCE
Status: COMPLETED | OUTPATIENT
Start: 2022-04-06 | End: 2022-04-06

## 2022-04-06 RX ADMIN — LAMOTRIGINE 100 MG: 100 TABLET ORAL at 09:08

## 2022-04-06 RX ADMIN — PIPERACILLIN SODIUM AND TAZOBACTAM SODIUM 3375 MG: 3; 375 INJECTION, POWDER, FOR SOLUTION INTRAVENOUS at 14:08

## 2022-04-06 RX ADMIN — VANCOMYCIN HYDROCHLORIDE 1250 MG: 5 INJECTION, POWDER, LYOPHILIZED, FOR SOLUTION INTRAVENOUS at 05:31

## 2022-04-06 RX ADMIN — VANCOMYCIN HYDROCHLORIDE 1000 MG: 1 INJECTION, POWDER, LYOPHILIZED, FOR SOLUTION INTRAVENOUS at 20:13

## 2022-04-06 RX ADMIN — PIPERACILLIN SODIUM AND TAZOBACTAM SODIUM 3375 MG: 3; 375 INJECTION, POWDER, FOR SOLUTION INTRAVENOUS at 22:33

## 2022-04-06 RX ADMIN — LAMOTRIGINE 100 MG: 100 TABLET ORAL at 20:05

## 2022-04-06 RX ADMIN — BUPRENORPHINE AND NALOXONE 1 FILM: 8; 2 FILM BUCCAL; SUBLINGUAL at 20:05

## 2022-04-06 RX ADMIN — PIPERACILLIN SODIUM AND TAZOBACTAM SODIUM 3375 MG: 3; 375 INJECTION, POWDER, FOR SOLUTION INTRAVENOUS at 07:15

## 2022-04-06 RX ADMIN — SODIUM CHLORIDE, PRESERVATIVE FREE 10 ML: 5 INJECTION INTRAVENOUS at 20:07

## 2022-04-06 RX ADMIN — BUPRENORPHINE AND NALOXONE 1 FILM: 8; 2 FILM BUCCAL; SUBLINGUAL at 09:43

## 2022-04-06 RX ADMIN — CETIRIZINE HYDROCHLORIDE 10 MG: 10 TABLET, FILM COATED ORAL at 09:08

## 2022-04-06 RX ADMIN — LIDOCAINE HYDROCHLORIDE,EPINEPHRINE BITARTRATE 20 ML: 20; .01 INJECTION, SOLUTION INFILTRATION; PERINEURAL at 09:17

## 2022-04-06 RX ADMIN — SODIUM CHLORIDE, PRESERVATIVE FREE 10 ML: 5 INJECTION INTRAVENOUS at 09:09

## 2022-04-06 RX ADMIN — MAGNESIUM OXIDE 400 MG (241.3 MG MAGNESIUM) TABLET 400 MG: TABLET at 09:08

## 2022-04-06 RX ADMIN — GABAPENTIN 800 MG: 400 CAPSULE ORAL at 14:02

## 2022-04-06 RX ADMIN — ENOXAPARIN SODIUM 40 MG: 100 INJECTION SUBCUTANEOUS at 09:09

## 2022-04-06 RX ADMIN — IBUPROFEN 400 MG: 400 TABLET, FILM COATED ORAL at 14:12

## 2022-04-06 RX ADMIN — GABAPENTIN 800 MG: 400 CAPSULE ORAL at 09:08

## 2022-04-06 RX ADMIN — ARIPIPRAZOLE 2 MG: 2 TABLET ORAL at 20:05

## 2022-04-06 RX ADMIN — MAGNESIUM OXIDE 400 MG (241.3 MG MAGNESIUM) TABLET 400 MG: TABLET at 20:05

## 2022-04-06 RX ADMIN — GABAPENTIN 800 MG: 400 CAPSULE ORAL at 20:05

## 2022-04-06 RX ADMIN — PANTOPRAZOLE SODIUM 40 MG: 40 TABLET, DELAYED RELEASE ORAL at 05:32

## 2022-04-06 ASSESSMENT — PAIN SCALES - GENERAL
PAINLEVEL_OUTOF10: 6
PAINLEVEL_OUTOF10: 0
PAINLEVEL_OUTOF10: 3

## 2022-04-06 NOTE — PROCEDURES
Incision and Drainage (I&D) Procedure Note    Patient ID:  Omar Ohxochitl  [id number]  64 y.o.  1980    Indications: Abscess of right forearm    Pre-operative Diagnosis: Abscess of right forearm    Post-operative Diagnosis: Same    Procedure:  I&D of right forearm abscess    Surgeon: Mynor Gottlieb MD     Findings:  Several cc's of purulence    Estimated Blood Loss:  96ZI          Complications:  None; patient tolerated the procedure well. Condition: stable    Procedure Details: The patient was positioned appropriately and the skin over the abscess site was prepped with betadine and draped in a sterile fashion. Local anesthesia was obtained by infiltration using 2% Lidocaine with epinephrine. A cruciate incision was then made over the greatest area of fluctuance, incision was 3cm x 1cm and extended into the subcutaneous tissue. Approximately 2 cc of purulent material was expressed. Loculations were not present. Wound was cultured with a swab. The drainage cavity was then irrigated and packed with sterile gauze. The patient tolerated the procedure well.       Electronically signed by Mynor Gottlieb MD on 4/6/2022 at 9:30 AM

## 2022-04-06 NOTE — PROGRESS NOTES
V2.0  [facility] Hospitalist Progress Note      Name:  Faby Meeks /Age/Sex: 1980  (42 y.o. female)   MRN & CSN:  [id number] & [id number] Encounter Date/Time: 2022 2:16 PM EDT    Location:  [facility] PCP: No primary care provider on file. Hospital Day: 3    Assessment and Plan:   Faby Meeks is a 42 y.o. female with pmh of IV drug abuse who presents with right forearm painful swelling. 1.  Right forearm cellulitis/abscess: This is due to IV drug abuse. Continue IV vancomycin and Zosyn. CT elbow reveals cellulitis, no abscess. Surgery consult acknowledged-I&D this morning. Follow-up culture result. 2.  Hypokalemia/hypomagnesemia: Replace and monitor. 3.  IV drug abuse: Recently used crack cocaine. On Suboxone. 4.  Chronic hepatitis C: Not a candidate for treatment while she is still an active drug user. 5.  Bipolar disorder: Continue home medications. 6.  Smoking: Smoking cessation counseling given. Nicotine patch offered. DVT prophylaxis: Lovenox  Disposition: She lives alone. She does not need home health care services. Diet ADULT DIET; Regular   DVT Prophylaxis [x] Lovenox, []  Heparin, [] SCDs, [] Ambulation,  [] Eliquis, [] Xarelto  [] Coumadin   Code Status Full Code   Disposition From: Home  Expected Disposition: Home  Estimated Date of Discharge: 2 to 3 days  Patient requires continued admission due to right forearm cellulitis   Surrogate Decision Maker/ POA      Subjective:     Chief Complaint: Abscess       Faby Meeks is a 42 y.o. female who presents with right forearm painful swelling. No new complaint. No fever. Review of Systems:    Review of Systems    Nothing significant.     Objective:     No intake or output data in the 24 hours ending 22 1409     Vitals:   Vitals:    22 0907   BP: 121/63   Pulse: 96   Resp: 18   Temp: 98.6 °F (37 °C)   SpO2: 94%       Physical Exam:   General: No apparent respiratory distress. Eyes: EOMI. Not pale. Anicteric. ENT: neck supple  Cardiovascular: Regular rate. Respiratory: Clear to auscultation  Gastrointestinal: Soft, non tender, no palpable mass  Genitourinary: no suprapubic tenderness  Musculoskeletal: Right forearm wound is dressed. Skin: warm, dry  Neuro: Alert. Oriented x3. No gross focal neurological deficit. Psych: Mood appropriate. Medications:   Medications:    buprenorphine-naloxone  1 Film SubLINGual BID    vancomycin  1,250 mg IntraVENous Q12H    nicotine  1 patch TransDERmal Daily    magnesium oxide  400 mg Oral BID    ARIPiprazole  2 mg Oral Nightly    cetirizine  10 mg Oral Daily    gabapentin  800 mg Oral TID    lamoTRIgine  100 mg Oral BID    pantoprazole  40 mg Oral QAM AC    sodium chloride flush  5-40 mL IntraVENous 2 times per day    enoxaparin  40 mg SubCUTAneous Daily    piperacillin-tazobactam  3,375 mg IntraVENous Q8H      Infusions:    sodium chloride       PRN Meds: sodium chloride flush, 5-40 mL, PRN  sodium chloride, , PRN  ondansetron, 4 mg, Q8H PRN   Or  ondansetron, 4 mg, Q6H PRN  polyethylene glycol, 17 g, Daily PRN  ibuprofen, 400 mg, Q6H PRN        Labs      Recent Results (from the past 24 hour(s))   Vancomycin Level, Trough    Collection Time: 04/06/22  8:35 AM   Result Value Ref Range    Vancomycin Tr 24.0 (H) 10 - 20 UG/ML    DOSE AMOUNT DOSE AMT.  GIVEN - 1250 mg     DOSE TIME DOSE TIME GIVEN - 0600    CBC    Collection Time: 04/06/22  8:35 AM   Result Value Ref Range    WBC 7.3 4.0 - 10.5 K/CU MM    RBC 3.88 (L) 4.2 - 5.4 M/CU MM    Hemoglobin 12.5 12.5 - 16.0 GM/DL    Hematocrit 37.7 37 - 47 %    MCV 97.2 78 - 100 FL    MCH 32.2 (H) 27 - 31 PG    MCHC 33.2 32.0 - 36.0 %    RDW 13.0 11.7 - 14.9 %    Platelets 576 790 - 857 K/CU MM    MPV 9.1 7.5 - 11.1 FL   Basic Metabolic Panel    Collection Time: 04/06/22  8:35 AM   Result Value Ref Range    Sodium 139 135 - 145 MMOL/L    Potassium 3.9 3.5 - 5.1 MMOL/L    Chloride 106 99 - 110 mMol/L    CO2 23 21 - 32 MMOL/L    Anion Gap 10 4 - 16    BUN 5 (L) 6 - 23 MG/DL    CREATININE 0.6 0.6 - 1.1 MG/DL    Glucose 102 (H) 70 - 99 MG/DL    Calcium 8.3 8.3 - 10.6 MG/DL    GFR Non-African American >60 >60 mL/min/1.73m2    GFR African American >60 >60 mL/min/1.73m2        Imaging/Diagnostics Last 24 Hours   CT ELBOW RIGHT W CONTRAST    Result Date: 4/5/2022  EXAMINATION: CT OF THE RIGHT ELBOW WITH CONTRAST 4/5/2022 9:16 am TECHNIQUE: CT of the right elbow was performed with the administration of intravenous contrast.  Multiplanar reformatted images are provided for review. Dose modulation, iterative reconstruction, and/or weight based adjustment of the mA/kV was utilized to reduce the radiation dose to as low as reasonably achievable. COMPARISON: None. HISTORY ORDERING SYSTEM PROVIDED HISTORY: abscess near joint. please scan above abd below TECHNOLOGIST PROVIDED HISTORY: Reason for exam:->abscess near joint. please scan above abd below Reason for Exam: abscess near joint. please scan above abd below Additional signs and symptoms: UNABLE TO COMPLETELY STRAIGHTEN ARM DUE TO PAIN Relevant Medical/Surgical History: 75 ML ISOVUE 370/ GFR>60 FINDINGS: There is a paucity of subcutaneous fat along the antecubital fossa which may be postsurgical. There is skin thickening and subcutaneous edema involving the volar and radial aspect of the forearm. A defined fluid collection is not identified. No soft tissue gas is identified. An acute osseous abnormality is not appreciated. There is no joint effusion. Skin thickening and subcutaneous stranding involving the volar and ulnar aspect of the forearm, most likely representing cellulitis. No abscess or soft tissue gas.        Electronically signed by Daisy Jimenez MD on 4/6/2022 at 2:09 PM

## 2022-04-06 NOTE — PROGRESS NOTES
[address] [facility]  consulted by Dr. Tonia Sheikh for monitoring and adjustment. Indication for treatment: Cellulitis of right forearm  Goal trough: [x] 10-15 mcg/mL or [] 15-20 mcg/ml  AUC/AKUA: [x] <500 or [] 400-600    Pertinent Laboratory Values:   Temp Readings from Last 3 Encounters:   04/06/22 97.9 °F (36.6 °C)   03/16/22 98 °F (36.7 °C) (Oral)   12/25/19 98.2 °F (36.8 °C) (Oral)     Recent Labs     04/04/22  1643 04/04/22  1705 04/05/22  0833 04/06/22  0835   WBC 7.3  --  11.3* 7.3   LACTATE  --  1.4  --   --      Recent Labs     04/04/22  1643 04/05/22  0833 04/06/22  0835   BUN 7 9 5*   CREATININE 0.5* 0.6 0.6     Estimated Creatinine Clearance: 107 mL/min (based on SCr of 0.6 mg/dL). No intake or output data in the 24 hours ending 04/06/22 1436    Pertinent Cultures:  Date    Source    Results  4/04   Blood x 2   Ordered  4/04   MRSA nasal screen  Ordered    Vancomycin level:   TROUGH:    Recent Labs     04/06/22  0835   VANCOTROUGH 24.0*     RANDOM:  No results for input(s): VANCORANDOM in the last 72 hours. Assessment:  · Day(s) of therapy: 3  · Vancomycin concentration: 24 - drawn post infusion    Plan:  · Decrease dose from vancomycin 1250 mg IVPB q12h to 1000mg IVPB q12h. · Predicted    · Random level 4/8/22 AM prior to dose. · Pharmacy will continue to monitor patient and adjust therapy as indicated    Kriss 3 4/08/22 @0600    Thank you for the consult.   Ivy Tobias, [facility]  4/6/2022 2:36 PM

## 2022-04-07 LAB
ANION GAP SERPL CALCULATED.3IONS-SCNC: 9 MMOL/L (ref 4–16)
BUN BLDV-MCNC: 3 MG/DL (ref 6–23)
CALCIUM SERPL-MCNC: 8.5 MG/DL (ref 8.3–10.6)
CHLORIDE BLD-SCNC: 105 MMOL/L (ref 99–110)
CO2: 24 MMOL/L (ref 21–32)
CREAT SERPL-MCNC: 0.5 MG/DL (ref 0.6–1.1)
CULTURE: NORMAL
GFR AFRICAN AMERICAN: >60 ML/MIN/1.73M2
GFR NON-AFRICAN AMERICAN: >60 ML/MIN/1.73M2
GLUCOSE BLD-MCNC: 107 MG/DL (ref 70–99)
HCT VFR BLD CALC: 39.4 % (ref 37–47)
HEMOGLOBIN: 12.6 GM/DL (ref 12.5–16)
Lab: NORMAL
MAGNESIUM: 1.8 MG/DL (ref 1.8–2.4)
MCH RBC QN AUTO: 31.5 PG (ref 27–31)
MCHC RBC AUTO-ENTMCNC: 32 % (ref 32–36)
MCV RBC AUTO: 98.5 FL (ref 78–100)
PDW BLD-RTO: 12.7 % (ref 11.7–14.9)
PLATELET # BLD: 341 K/CU MM (ref 140–440)
PMV BLD AUTO: 9.1 FL (ref 7.5–11.1)
POTASSIUM SERPL-SCNC: 3.9 MMOL/L (ref 3.5–5.1)
RBC # BLD: 4 M/CU MM (ref 4.2–5.4)
SODIUM BLD-SCNC: 138 MMOL/L (ref 135–145)
SPECIMEN: NORMAL
WBC # BLD: 6.1 K/CU MM (ref 4–10.5)

## 2022-04-07 PROCEDURE — 80048 BASIC METABOLIC PNL TOTAL CA: CPT

## 2022-04-07 PROCEDURE — 2580000003 HC RX 258: Performed by: STUDENT IN AN ORGANIZED HEALTH CARE EDUCATION/TRAINING PROGRAM

## 2022-04-07 PROCEDURE — 85027 COMPLETE CBC AUTOMATED: CPT

## 2022-04-07 PROCEDURE — 36415 COLL VENOUS BLD VENIPUNCTURE: CPT

## 2022-04-07 PROCEDURE — 76937 US GUIDE VASCULAR ACCESS: CPT

## 2022-04-07 PROCEDURE — 99024 POSTOP FOLLOW-UP VISIT: CPT | Performed by: SURGERY

## 2022-04-07 PROCEDURE — 1200000000 HC SEMI PRIVATE

## 2022-04-07 PROCEDURE — 2580000003 HC RX 258

## 2022-04-07 PROCEDURE — 6370000000 HC RX 637 (ALT 250 FOR IP): Performed by: INTERNAL MEDICINE

## 2022-04-07 PROCEDURE — 6370000000 HC RX 637 (ALT 250 FOR IP): Performed by: STUDENT IN AN ORGANIZED HEALTH CARE EDUCATION/TRAINING PROGRAM

## 2022-04-07 PROCEDURE — 83735 ASSAY OF MAGNESIUM: CPT

## 2022-04-07 PROCEDURE — 6360000002 HC RX W HCPCS: Performed by: STUDENT IN AN ORGANIZED HEALTH CARE EDUCATION/TRAINING PROGRAM

## 2022-04-07 PROCEDURE — 94761 N-INVAS EAR/PLS OXIMETRY MLT: CPT

## 2022-04-07 RX ADMIN — IBUPROFEN 400 MG: 400 TABLET, FILM COATED ORAL at 09:55

## 2022-04-07 RX ADMIN — LAMOTRIGINE 100 MG: 100 TABLET ORAL at 22:07

## 2022-04-07 RX ADMIN — VANCOMYCIN HYDROCHLORIDE 1000 MG: 1 INJECTION, POWDER, LYOPHILIZED, FOR SOLUTION INTRAVENOUS at 22:06

## 2022-04-07 RX ADMIN — WATER: 1 INJECTION INTRAMUSCULAR; INTRAVENOUS; SUBCUTANEOUS at 18:21

## 2022-04-07 RX ADMIN — MAGNESIUM OXIDE 400 MG (241.3 MG MAGNESIUM) TABLET 400 MG: TABLET at 22:07

## 2022-04-07 RX ADMIN — SODIUM CHLORIDE, PRESERVATIVE FREE 10 ML: 5 INJECTION INTRAVENOUS at 22:07

## 2022-04-07 RX ADMIN — PIPERACILLIN SODIUM AND TAZOBACTAM SODIUM 3375 MG: 3; 375 INJECTION, POWDER, FOR SOLUTION INTRAVENOUS at 15:17

## 2022-04-07 RX ADMIN — PIPERACILLIN SODIUM AND TAZOBACTAM SODIUM 3375 MG: 3; 375 INJECTION, POWDER, FOR SOLUTION INTRAVENOUS at 23:14

## 2022-04-07 RX ADMIN — ARIPIPRAZOLE 2 MG: 2 TABLET ORAL at 22:06

## 2022-04-07 RX ADMIN — GABAPENTIN 800 MG: 400 CAPSULE ORAL at 15:18

## 2022-04-07 RX ADMIN — BUPRENORPHINE AND NALOXONE 1 FILM: 8; 2 FILM BUCCAL; SUBLINGUAL at 09:49

## 2022-04-07 RX ADMIN — VANCOMYCIN HYDROCHLORIDE 1000 MG: 1 INJECTION, POWDER, LYOPHILIZED, FOR SOLUTION INTRAVENOUS at 06:06

## 2022-04-07 RX ADMIN — BUPRENORPHINE AND NALOXONE 1 FILM: 8; 2 FILM BUCCAL; SUBLINGUAL at 22:06

## 2022-04-07 RX ADMIN — CETIRIZINE HYDROCHLORIDE 10 MG: 10 TABLET, FILM COATED ORAL at 09:49

## 2022-04-07 RX ADMIN — MAGNESIUM OXIDE 400 MG (241.3 MG MAGNESIUM) TABLET 400 MG: TABLET at 09:49

## 2022-04-07 RX ADMIN — POLYETHYLENE GLYCOL (3350) 17 G: 17 POWDER, FOR SOLUTION ORAL at 11:58

## 2022-04-07 RX ADMIN — PIPERACILLIN SODIUM AND TAZOBACTAM SODIUM 3375 MG: 3; 375 INJECTION, POWDER, FOR SOLUTION INTRAVENOUS at 07:40

## 2022-04-07 RX ADMIN — PANTOPRAZOLE SODIUM 40 MG: 40 TABLET, DELAYED RELEASE ORAL at 06:07

## 2022-04-07 RX ADMIN — ENOXAPARIN SODIUM 40 MG: 100 INJECTION SUBCUTANEOUS at 09:49

## 2022-04-07 RX ADMIN — GABAPENTIN 800 MG: 400 CAPSULE ORAL at 09:49

## 2022-04-07 RX ADMIN — LAMOTRIGINE 100 MG: 100 TABLET ORAL at 09:49

## 2022-04-07 RX ADMIN — GABAPENTIN 800 MG: 400 CAPSULE ORAL at 22:07

## 2022-04-07 ASSESSMENT — PAIN SCALES - GENERAL
PAINLEVEL_OUTOF10: 5
PAINLEVEL_OUTOF10: 0

## 2022-04-07 NOTE — FLOWSHEET NOTE
04/07/22 1440   Encounter Summary   Services provided to: Patient   Referral/Consult From: Lio Irwin not discussed during this viisit   Contact [employer] No   Continue Visiting No  (as needed)   Volunteer Visit No   Complexity of Encounter Low   Length of Encounter 15 minutes   Routine   Type Initial   Assessment Coping   Intervention Explored feelings, thoughts, concerns;Sustaining presence/ Ministry of presence   Spiritual/Sikhism   Type Spiritual support

## 2022-04-07 NOTE — PROGRESS NOTES
Physician Progress Note      Braden Leonardo  CSN #:                  [id number]  :                       1980  ADMIT DATE:       2022 3:29 PM  DISCH DATE:  RESPONDING  PROVIDER #:        AURA SUMMERS          QUERY TEXT:    Hospitalists,    Patient admitted with sepsis due to cellulitis and abscess of RUE. Noted   sepsis dropped from subsequent documentation. If possible, please document in   the progress notes and discharge summary if  sepsis was: The medical record reflects the following:  Risk Factors: cellulitis and abscess  Clinical Indicators: Per H&P documentation: \"Patient is septic and we will   complete a septic work-up and start patient on broad-spectrum antibiotics\"; HR   72-99, no SIRS, normal lactate, no organ dysfunction specified  Treatment: labs, imaging, Surgical consult w/ I&D of abscess, IV Zosyn & Vanco    Thank you,  Cheryl Jim RN [facility]  [phone]  Options provided:  -- Sepsis confirmed after study  -- Sepsis  treated and resolved  -- Sepsis ruled out after study  -- Other - I will add my own diagnosis  -- Disagree - Not applicable / Not valid  -- Disagree - Clinically unable to determine / Unknown  -- Refer to Clinical Documentation Reviewer    PROVIDER RESPONSE TEXT:    Sepsis* ruled out after study.     Query created by: Hermila Ferris on 2022 3:10 PM      Electronically signed by:  Stephanie Tanner 2022 12:17 PM

## 2022-04-07 NOTE — CONSULTS
IV Consult complete. Nexiva 20g 1.75\" Extra Long PIV inserted in left FA x1 attempt, brisk blood return, flushes without resistance.

## 2022-04-07 NOTE — PROGRESS NOTES
GENERAL SURGERY PROGRESS NOTE    Julio Riddle is a 42 y.o. female POD1 from bedside I&D of right forearm abscess. Subjective:  Doing ok today. Swelling continues to decrease in the right arm. Objective:    Vitals: VITALS:  /68   Pulse 89   Temp 98.1 °F (36.7 °C)   Resp 20   Ht 5' 2\" (1.575 m)   Wt 140 lb (63.5 kg)   SpO2 95%   BMI 25.61 kg/m²     I/O: No intake/output data recorded. Labs/Imaging Results:   Lab Results   Component Value Date     04/06/2022    K 3.9 04/06/2022     04/06/2022    CO2 23 04/06/2022    BUN 5 04/06/2022    CREATININE 0.6 04/06/2022    GLUCOSE 102 04/06/2022    CALCIUM 8.3 04/06/2022      Lab Results   Component Value Date    WBC 7.3 04/06/2022    HGB 12.5 04/06/2022    HCT 37.7 04/06/2022    MCV 97.2 04/06/2022     04/06/2022       IV Fluids: sodium chloride    Scheduled Meds:   buprenorphine-naloxone, 1 Film, SubLINGual, BID    vancomycin, 1,000 mg, IntraVENous, Q12H    nicotine, 1 patch, TransDERmal, Daily    magnesium oxide, 400 mg, Oral, BID    ARIPiprazole, 2 mg, Oral, Nightly    cetirizine, 10 mg, Oral, Daily    gabapentin, 800 mg, Oral, TID    lamoTRIgine, 100 mg, Oral, BID    pantoprazole, 40 mg, Oral, QAM AC    sodium chloride flush, 5-40 mL, IntraVENous, 2 times per day    enoxaparin, 40 mg, SubCUTAneous, Daily    piperacillin-tazobactam, 3,375 mg, IntraVENous, Q8H    Physical Exam:  General: A&O x 3, no distress. HEENT: Anicteric sclerae, MMM. Extremities: right arm incision clean and dry, no purulence, decreasing induration    Assessment and Plan:  42 y.o. female s/p right arm I&D. Doing ok.     Patient Active Problem List:     Cellulitis and abscess of upper arm and forearm     Cellulitis of right forearm     Abscess of right arm      - ok to work towards discharge from a surgical perspective  - would continue on PO antibiotics for 1 week and continue daily dressing changes  - follow up with me in clinic in 1 week, sooner if problems    Alexandru Zhang MD

## 2022-04-07 NOTE — PROGRESS NOTES
[address]  consulted by Dr. Luis Toledo for monitoring and adjustment. Indication for treatment: Cellulitis of right forearm  Goal trough: [x] 10-15 mcg/mL or [] 15-20 mcg/ml  AUC/AKUA: [x] <500 or [] 400-600    Pertinent Laboratory Values:   Temp Readings from Last 3 Encounters:   04/07/22 98.1 °F (36.7 °C)   03/16/22 98 °F (36.7 °C) (Oral)   12/25/19 98.2 °F (36.8 °C) (Oral)     Recent Labs     04/04/22  1643 04/04/22  1705 04/05/22  0833 04/06/22  0835   WBC 7.3  --  11.3* 7.3   LACTATE  --  1.4  --   --      Recent Labs     04/04/22  1643 04/05/22  0833 04/06/22  0835   BUN 7 9 5*   CREATININE 0.5* 0.6 0.6     Estimated Creatinine Clearance: 107 mL/min (based on SCr of 0.6 mg/dL). No intake or output data in the 24 hours ending 04/07/22 0903    Pertinent Cultures:  Date    Source    Results  4/04   Blood x 2   Ordered  4/06   Wound    Sent    Vancomycin level:   TROUGH:    Recent Labs     04/06/22  0835   VANCOTROUGH 24.0*     RANDOM:  No results for input(s): VANCORANDOM in the last 72 hours. Assessment:  · Day(s) of therapy: 4  · Vancomycin concentration: 24 - drawn post infusion    Plan:  · Vancomycin 1000mg IVPB q12h. · Pharmacy will continue to monitor patient and adjust therapy as indicated    [place] 3 4/08/22 @0600    Thank you for the consult.   Cyrus Esparza, [facility]  4/7/2022 9:03 AM

## 2022-04-07 NOTE — PROGRESS NOTES
V2.0  [facility] Hospitalist Progress Note      Name:  Ed Bennett /Age/Sex: 1980  (42 y.o. female)   MRN & CSN:  [id number] & [id number] Encounter Date/Time: 2022 2:16 PM EDT    Location:  [address] [place] [address] PCP: No primary care provider on file. Hospital Day: 4    Assessment and Plan:   Ed Bennett is a 42 y.o. female with pmh of IV drug abuse who presents with right forearm painful swelling. 1.  Right forearm cellulitis/abscess: This is due to IV drug abuse. Continue IV vancomycin and Zosyn. CT elbow reveals cellulitis, no abscess. Surgery consult acknowledged-I&D on  . Blood culture-no growth yet. Wound culture-pending. 2.  Hypokalemia/hypomagnesemia: Replace and monitor. 3.  IV drug abuse: Recently used crack cocaine. On Suboxone. 4.  Chronic hepatitis C: Not a candidate for treatment while she is still an active drug user. 5.  Bipolar disorder: Continue home medications. 6.  Smoking: Smoking cessation counseling given. Nicotine patch offered. DVT prophylaxis: Lovenox  Disposition: She lives alone. She does not need home health care services. Diet ADULT DIET; Regular   DVT Prophylaxis [x] Lovenox, []  Heparin, [] SCDs, [] Ambulation,  [] Eliquis, [] Xarelto  [] Coumadin   Code Status Full Code   Disposition From: Home  Expected Disposition: Home  Estimated Date of Discharge: 2 to 3 days  Patient requires continued admission due to right forearm cellulitis   Surrogate Decision Maker/ POA      Subjective:     Chief Complaint: Abscess       Ed Bennett is a 42 y.o. female who presents with right forearm painful swelling. No fever or chills. No new complaints. Review of Systems:    Review of Systems    Nothing significant.     Objective:     No intake or output data in the 24 hours ending 22 1210     Vitals:   Vitals:    22 0745   BP: 114/68   Pulse: 89   Resp: 20   Temp: 98.1 °F (36.7 °C)   SpO2: 95%       Physical Exam: General: No apparent respiratory distress. Eyes: EOMI. Not pale. Anicteric. ENT: neck supple  Cardiovascular: Regular rate. Respiratory: Clear to auscultation  Gastrointestinal: Soft, non tender, no palpable mass  Genitourinary: no suprapubic tenderness  Musculoskeletal: Right forearm wound is dressed. Skin: warm, dry  Neuro: Alert. Oriented x3. No gross focal neurological deficit. Psych: Mood appropriate. Medications:   Medications:    buprenorphine-naloxone  1 Film SubLINGual BID    vancomycin  1,000 mg IntraVENous Q12H    nicotine  1 patch TransDERmal Daily    magnesium oxide  400 mg Oral BID    ARIPiprazole  2 mg Oral Nightly    cetirizine  10 mg Oral Daily    gabapentin  800 mg Oral TID    lamoTRIgine  100 mg Oral BID    pantoprazole  40 mg Oral QAM AC    sodium chloride flush  5-40 mL IntraVENous 2 times per day    enoxaparin  40 mg SubCUTAneous Daily    piperacillin-tazobactam  3,375 mg IntraVENous Q8H      Infusions:    sodium chloride       PRN Meds: sodium chloride flush, 5-40 mL, PRN  sodium chloride, , PRN  ondansetron, 4 mg, Q8H PRN   Or  ondansetron, 4 mg, Q6H PRN  polyethylene glycol, 17 g, Daily PRN  ibuprofen, 400 mg, Q6H PRN        Labs      Recent Results (from the past 24 hour(s))   CBC    Collection Time: 04/07/22 11:23 AM   Result Value Ref Range    WBC 6.1 4.0 - 10.5 K/CU MM    RBC 4.00 (L) 4.2 - 5.4 M/CU MM    Hemoglobin 12.6 12.5 - 16.0 GM/DL    Hematocrit 39.4 37 - 47 %    MCV 98.5 78 - 100 FL    MCH 31.5 (H) 27 - 31 PG    MCHC 32.0 32.0 - 36.0 %    RDW 12.7 11.7 - 14.9 %    Platelets 083 115 - 216 K/CU MM    MPV 9.1 7.5 - 11.1 FL        Imaging/Diagnostics Last 24 Hours   CT ELBOW RIGHT W CONTRAST    Result Date: 4/5/2022  EXAMINATION: CT OF THE RIGHT ELBOW WITH CONTRAST 4/5/2022 9:16 am TECHNIQUE: CT of the right elbow was performed with the administration of intravenous contrast.  Multiplanar reformatted images are provided for review.  Dose modulation, iterative reconstruction, and/or weight based adjustment of the mA/kV was utilized to reduce the radiation dose to as low as reasonably achievable. COMPARISON: None. HISTORY ORDERING SYSTEM PROVIDED HISTORY: abscess near joint. please scan above abd below TECHNOLOGIST PROVIDED HISTORY: Reason for exam:->abscess near joint. please scan above abd below Reason for Exam: abscess near joint. please scan above abd below Additional signs and symptoms: UNABLE TO COMPLETELY STRAIGHTEN ARM DUE TO PAIN Relevant Medical/Surgical History: 75 ML ISOVUE 370/ GFR>60 FINDINGS: There is a paucity of subcutaneous fat along the antecubital fossa which may be postsurgical. There is skin thickening and subcutaneous edema involving the volar and radial aspect of the forearm. A defined fluid collection is not identified. No soft tissue gas is identified. An acute osseous abnormality is not appreciated. There is no joint effusion. Skin thickening and subcutaneous stranding involving the volar and ulnar aspect of the forearm, most likely representing cellulitis. No abscess or soft tissue gas.        Electronically signed by Kae Antunez MD on 4/7/2022 at 12:10 PM

## 2022-04-08 VITALS
HEART RATE: 87 BPM | OXYGEN SATURATION: 96 % | SYSTOLIC BLOOD PRESSURE: 131 MMHG | TEMPERATURE: 98.4 F | RESPIRATION RATE: 16 BRPM | WEIGHT: 167.8 LBS | HEIGHT: 62 IN | DIASTOLIC BLOOD PRESSURE: 74 MMHG | BODY MASS INDEX: 30.88 KG/M2

## 2022-04-08 LAB
ANION GAP SERPL CALCULATED.3IONS-SCNC: 9 MMOL/L (ref 4–16)
BUN BLDV-MCNC: 7 MG/DL (ref 6–23)
CALCIUM SERPL-MCNC: 7.9 MG/DL (ref 8.3–10.6)
CHLORIDE BLD-SCNC: 103 MMOL/L (ref 99–110)
CO2: 26 MMOL/L (ref 21–32)
CREAT SERPL-MCNC: 0.5 MG/DL (ref 0.6–1.1)
DOSE AMOUNT: NORMAL
DOSE TIME: NORMAL
GFR AFRICAN AMERICAN: >60 ML/MIN/1.73M2
GFR NON-AFRICAN AMERICAN: >60 ML/MIN/1.73M2
GLUCOSE BLD-MCNC: 101 MG/DL (ref 70–99)
POTASSIUM SERPL-SCNC: 4 MMOL/L (ref 3.5–5.1)
SODIUM BLD-SCNC: 138 MMOL/L (ref 135–145)
VANCOMYCIN RANDOM: 17.6 UG/ML

## 2022-04-08 PROCEDURE — 80048 BASIC METABOLIC PNL TOTAL CA: CPT

## 2022-04-08 PROCEDURE — 6360000002 HC RX W HCPCS: Performed by: STUDENT IN AN ORGANIZED HEALTH CARE EDUCATION/TRAINING PROGRAM

## 2022-04-08 PROCEDURE — 6370000000 HC RX 637 (ALT 250 FOR IP): Performed by: STUDENT IN AN ORGANIZED HEALTH CARE EDUCATION/TRAINING PROGRAM

## 2022-04-08 PROCEDURE — 6370000000 HC RX 637 (ALT 250 FOR IP): Performed by: NURSE PRACTITIONER

## 2022-04-08 PROCEDURE — 2580000003 HC RX 258: Performed by: STUDENT IN AN ORGANIZED HEALTH CARE EDUCATION/TRAINING PROGRAM

## 2022-04-08 PROCEDURE — 36415 COLL VENOUS BLD VENIPUNCTURE: CPT

## 2022-04-08 PROCEDURE — 80202 ASSAY OF VANCOMYCIN: CPT

## 2022-04-08 PROCEDURE — 6370000000 HC RX 637 (ALT 250 FOR IP): Performed by: INTERNAL MEDICINE

## 2022-04-08 RX ORDER — MAGNESIUM OXIDE 400 MG/1
400 TABLET ORAL 2 TIMES DAILY
Qty: 10 TABLET | Refills: 0 | Status: SHIPPED | OUTPATIENT
Start: 2022-04-08 | End: 2022-04-13

## 2022-04-08 RX ORDER — DOXYCYCLINE HYCLATE 100 MG
100 TABLET ORAL 2 TIMES DAILY
Qty: 14 TABLET | Refills: 0 | Status: SHIPPED | OUTPATIENT
Start: 2022-04-08 | End: 2022-04-15

## 2022-04-08 RX ORDER — AMOXICILLIN 500 MG/1
500 CAPSULE ORAL 3 TIMES DAILY
Qty: 21 CAPSULE | Refills: 0 | Status: SHIPPED | OUTPATIENT
Start: 2022-04-08 | End: 2022-04-15

## 2022-04-08 RX ADMIN — VANCOMYCIN HYDROCHLORIDE 1000 MG: 1 INJECTION, POWDER, LYOPHILIZED, FOR SOLUTION INTRAVENOUS at 05:51

## 2022-04-08 RX ADMIN — CETIRIZINE HYDROCHLORIDE 10 MG: 10 TABLET, FILM COATED ORAL at 07:58

## 2022-04-08 RX ADMIN — BISACODYL 10 MG: 5 TABLET, COATED ORAL at 04:17

## 2022-04-08 RX ADMIN — ENOXAPARIN SODIUM 40 MG: 100 INJECTION SUBCUTANEOUS at 08:00

## 2022-04-08 RX ADMIN — MAGNESIUM OXIDE 400 MG (241.3 MG MAGNESIUM) TABLET 400 MG: TABLET at 08:01

## 2022-04-08 RX ADMIN — IBUPROFEN 400 MG: 400 TABLET, FILM COATED ORAL at 03:54

## 2022-04-08 RX ADMIN — GABAPENTIN 800 MG: 400 CAPSULE ORAL at 08:01

## 2022-04-08 RX ADMIN — PIPERACILLIN SODIUM AND TAZOBACTAM SODIUM 3375 MG: 3; 375 INJECTION, POWDER, FOR SOLUTION INTRAVENOUS at 06:54

## 2022-04-08 RX ADMIN — LAMOTRIGINE 100 MG: 100 TABLET ORAL at 08:02

## 2022-04-08 RX ADMIN — PANTOPRAZOLE SODIUM 40 MG: 40 TABLET, DELAYED RELEASE ORAL at 05:51

## 2022-04-08 RX ADMIN — BUPRENORPHINE AND NALOXONE 1 FILM: 8; 2 FILM BUCCAL; SUBLINGUAL at 08:00

## 2022-04-08 ASSESSMENT — ENCOUNTER SYMPTOMS
BLOOD IN STOOL: 0
RHINORRHEA: 0
NAUSEA: 1
SHORTNESS OF BREATH: 0
COUGH: 0
SORE THROAT: 0
CHEST TIGHTNESS: 0
ABDOMINAL PAIN: 1
COLOR CHANGE: 0
VOMITING: 0
BACK PAIN: 0
DIARRHEA: 0
ANAL BLEEDING: 0
WHEEZING: 0

## 2022-04-08 ASSESSMENT — PAIN DESCRIPTION - LOCATION: LOCATION: ARM

## 2022-04-08 ASSESSMENT — PAIN DESCRIPTION - ORIENTATION: ORIENTATION: RIGHT

## 2022-04-08 ASSESSMENT — PAIN DESCRIPTION - PAIN TYPE: TYPE: ACUTE PAIN

## 2022-04-08 ASSESSMENT — PAIN SCALES - GENERAL
PAINLEVEL_OUTOF10: 7
PAINLEVEL_OUTOF10: 6

## 2022-04-08 NOTE — PLAN OF CARE
Problem: Daily Care:  Goal: Daily care needs are met  Description: Daily care needs are met  Outcome: Met This Shift     Problem: Pain:  Goal: Patient's pain/discomfort is manageable  Description: Patient's pain/discomfort is manageable  Outcome: Met This Shift     Problem: Infection:  Goal: Will remain free from infection  Description: Will remain free from infection  Outcome: Ongoing     Problem: Safety:  Goal: Free from accidental physical injury  Description: Free from accidental physical injury  Outcome: Ongoing

## 2022-04-08 NOTE — PROGRESS NOTES
Outpatient Pharmacy Progress Note for Meds-to-Beds    Total number of Prescriptions Filled: 3  The following medications were dispensed to the patient during the discharge process:   Doxycycline 100mg   Amoxicillin 500mg   Magnesium oxide 400mg    Additional Documentation:   Patient picked-up the medication(s) in the OP Pharmacy      Thank you for letting us serve your patients.   [address] [place]    [address], [address] [facility]    Phone: [phone]    Fax: [phone]

## 2022-04-08 NOTE — DISCHARGE INSTR - DIET

## 2022-04-08 NOTE — DISCHARGE SUMMARY
V2.0  Discharge Summary    Name:  Thiago Angel /Age/Sex: 1980 (42 y.o. female)   Admit Date: 2022  Discharge Date: 22    MRN & CSN:  [id number] & [id number] Encounter Date and Time 22 1:55 PM EDT    Attending:  No att. providers found Discharging Provider: Daisy Jimenez MD       Hospital Course:     Brief HPI: Thiago Angel is a 42 y.o. female who presented with right forearm painful swelling. Brief Problem Based Course:  Thiago Angel is a 42 y.o. female with pmh of IVDA, hepatitis C, anxiety, migraines, hemorrhoids, tobacco abuse, GERD, and obesity, who presents with pain and swelling in her right forearm. Patient has a history of IVDA and recently relapsed 3 weeks ago and shot up into her right forearm. Patient states she missed her vein and noted approximately 4 days later that the lump got bigger. She waited another week before she went to the ED. Patient states she was given antibiotics and took all of her antibiotics which with a 10-day course of Bactrim and Keflex. She states immediately after completion of antibiotics she noticed worsening erythema, warmth, and pain to that area. This has been developing over the last 3 days. She states she has not seen any drainage from the area. She states pain is a 7/10 on the pain scale exacerbated by movement or touch and better with keeping still. Patient does endorse decreased range of motion of that elbow. Patient states she has occasional fevers and chills, epigastric abdominal pain with nausea but without emesis. Patient endorses malaise and fatigue. Otherwise patient denies headache, chest pain, shortness of breath, cough, diarrhea, melena, hematochezia, dysuria, frequency, or urgency. Admitted as:  Thiago Angel is a 42 y.o. female with pmh of IV drug abuse who presents with right forearm painful swelling.     1. Right forearm cellulitis/abscess: This is due to IV drug abuse.     Treated with IV vancomycin and Zosyn.  CT elbow reveals cellulitis, no abscess. Surgery consulted-I&D on 4/6 . Blood culture-no growth yet. Wound culture-no growth yet. The swelling and redness improved. Discharged on oral doxycycline and amoxicillin. Continue daily wound dressing. She will follow-up in surgery clinic next week.     2. Hypokalemia/hypomagnesemia: Replaced     3.  IV drug abuse: Recently used crack cocaine. On Suboxone. Strongly advised to quit this habit.     4. Chronic hepatitis C: Not a candidate for treatment while she is still an active drug user.     5. Bipolar disorder: Continue home medications.     6.  Smoking: Smoking cessation counseling given. Nicotine patch offered. The patient expressed appropriate understanding of, and agreement with the discharge recommendations, medications, and plan. Consults this admission:  PHARMACY TO DOSE VANCOMYCIN  IP CONSULT TO HOSPITALIST  PHARMACY TO DOSE VANCOMYCIN  IP CONSULT TO GENERAL SURGERY  IP CONSULT TO IV TEAM    Discharge Diagnosis:   Cellulitis of right forearm    Right forearm cellulitis with abscess  Hypokalemia  Hypomagnesemia  IV drug abuse    Discharge Instruction:   Follow up appointments: PCP and surgery  Primary care physician: No primary care provider on file. within 2 weeks  Diet: regular diet   Activity: activity as tolerated  Disposition: Discharged to:   [x]Home, [][facility], []SNF, []Acute Rehab, []Hospice   Condition on discharge: Stable  Labs and Tests to be Followed up as an outpatient by PCP or Specialist: Follow-up final culture result.     Discharge Medications:        Medication List      START taking these medications    amoxicillin 500 MG capsule  Commonly known as: AMOXIL  Take 1 capsule by mouth 3 times daily for 7 days     doxycycline hyclate 100 MG tablet  Commonly known as: VIBRA-TABS  Take 1 tablet by mouth 2 times daily for 7 days     magnesium oxide 400 MG tablet  Commonly known as: MAG-OX  Take 1 tablet by mouth 2 times daily for modulation, iterative reconstruction, and/or weight based adjustment of the mA/kV was utilized to reduce the radiation dose to as low as reasonably achievable. COMPARISON: None. HISTORY ORDERING SYSTEM PROVIDED HISTORY: abscess near joint. please scan above abd below TECHNOLOGIST PROVIDED HISTORY: Reason for exam:->abscess near joint. please scan above abd below Reason for Exam: abscess near joint. please scan above abd below Additional signs and symptoms: UNABLE TO COMPLETELY STRAIGHTEN ARM DUE TO PAIN Relevant Medical/Surgical History: 75 ML ISOVUE 370/ GFR>60 FINDINGS: There is a paucity of subcutaneous fat along the antecubital fossa which may be postsurgical. There is skin thickening and subcutaneous edema involving the volar and radial aspect of the forearm. A defined fluid collection is not identified. No soft tissue gas is identified. An acute osseous abnormality is not appreciated. There is no joint effusion. Skin thickening and subcutaneous stranding involving the volar and ulnar aspect of the forearm, most likely representing cellulitis. No abscess or soft tissue gas. CBC:   Recent Labs     04/06/22  0835 04/07/22  1123   WBC 7.3 6.1   HGB 12.5 12.6    341     BMP:    Recent Labs     04/06/22  0835 04/07/22  1123 04/08/22  0841    138 138   K 3.9 3.9 4.0    105 103   CO2 23 24 26   BUN 5* 3* 7   CREATININE 0.6 0.5* 0.5*   GLUCOSE 102* 107* 101*     Hepatic: No results for input(s): AST, ALT, ALB, BILITOT, ALKPHOS in the last 72 hours. Lipids: No results found for: CHOL, HDL, TRIG  Hemoglobin A1C: No results found for: LABA1C  TSH: No results found for: TSH  Troponin:   Lab Results   Component Value Date    TROPONINT <0.010 11/18/2015     Lactic Acid: No results for input(s): LACTA in the last 72 hours. BNP: No results for input(s): PROBNP in the last 72 hours.   UA:  Lab Results   Component Value Date    NITRU NEGATIVE 12/25/2019    COLORU MARTHA 12/25/2019    WBCUA 7 12/25/2019    RBCUA 3 12/25/2019    MUCUS RARE 12/25/2019    TRICHOMONAS NONE SEEN 12/25/2019    BACTERIA OCCASIONAL 12/25/2019    CLARITYU HAZY 12/25/2019    SPECGRAV 1.029 12/25/2019    LEUKOCYTESUR SMALL 12/25/2019    UROBILINOGEN 2.0 12/25/2019    BILIRUBINUR SMALL 12/25/2019    BLOODU NEGATIVE 12/25/2019    KETUA NEGATIVE 12/25/2019     Urine Cultures: No results found for: Jovani Matamoros  Blood Cultures: No results found for: BC  No results found for: BLOODCULT2  Organism:   Lab Results   Component Value Date    ORG MRSA 08/29/2013       Time Spent Discharging patient 34 minutes    Electronically signed by Dena Jung MD on 4/8/2022 at 1:55 PM

## 2022-04-08 NOTE — DISCHARGE INSTR - COC
Continuity of Care Form    Patient Name: Hank Fulton   :  1980  MRN:  [id number]    Admit date:  2022  Discharge date:  ***    Code Status Order: Full Code   Advance Directives:      Admitting Physician:  No admitting provider for patient encounter. PCP: No primary care provider on file.     Discharging Nurse: [facility] Unit/Room#: 4019/4019-A  Discharging Unit Phone Number: ***    Emergency Contact:   Extended Emergency Contact Information  Primary Emergency Contact: Faviola Moon   [address] Phone: [phone]  Mobile Phone: [phone]  Relation: Parent    Past Surgical History:  Past Surgical History:   Procedure Laterality Date    ADENOIDECTOMY      CHOLECYSTECTOMY      COLONOSCOPY  2015    Polyp cecum, hemorrhoids    TONSILLECTOMY      TUBAL LIGATION         Immunization History:   Immunization History   Administered Date(s) Administered    COVID-19, Pfizer Purple top, DILUTE for use, 12+ yrs, 30mcg/0.3mL dose 2021    Tdap (Boostrix, Adacel) 2019, 10/06/2019       Active Problems:  Patient Active Problem List   Diagnosis Code    Cellulitis and abscess of upper arm and forearm [id number]    Cellulitis of right forearm L03.113    Abscess of right arm L02.413       Isolation/Infection:   Isolation            No Isolation          Patient Infection Status       Infection Onset Added Last Indicated Last Indicated By Review Planned Expiration Resolved Resolved By    None active    Resolved    MRSA  13 Kai Hernandez RN   11/18/15 Lauren Mathew RN    wound 13            Nurse Assessment:  Last Vital Signs: /74   Pulse 87   Temp 98.4 °F (36.9 °C) (Oral)   Resp 16   Ht 5' 2\" (1.575 m)   Wt 167 lb 12.8 oz (76.1 kg)   SpO2 96%   BMI 30.69 kg/m²     Last documented pain score (0-10 scale): Pain Level: 7  Last Weight:   Wt Readings from Last 1 Encounters:   22 167 lb 12.8 oz (76.1 kg)     Mental Status:  {IP PT MENTAL STATUS:20030}    IV Access:  { OLIVIA IV ACCESS:011943706}    Nursing Mobility/ADLs:  Walking   {P DME EKZF:937510661}  Transfer  {[facility] DME XYHU:001565118}  Bathing  {CHP DME WPWQ:487370090}  Dressing  {CHP DME COHF:353711138}  Toileting  {P DME FEQY:644432917}  Feeding  {[facility] DME BFKQ:384875699}  Med Admin  {[facility] DME JRVI:783364197}  Med Delivery   { OLIVIA MED Delivery:502027033}    Wound Care Documentation and Therapy:  Wound 04/07/22 Radial Proximal;Right surgical wound (Active)   Wound Etiology Surgical 04/07/22 2000   Dressing Status Reinforced dressing 04/08/22 0752   Wound Cleansed Irrigated with saline;Cleansed with saline 04/08/22 0417   Dressing/Treatment Moist to dry;Gauze dressing/dressing sponge;ABD;Roll gauze 04/08/22 0417   Wound Assessment [facility] 04/08/22 0417   Drainage Amount Scant 04/08/22 0417   Drainage Description Yellow 04/08/22 0417   Odor None 04/08/22 0752   Kaylah-wound Assessment Warm 04/08/22 0752   Margins Defined edges 04/08/22 0417   Number of days: 0        Elimination:  Continence: Bowel: {YES / OR:70402}  Bladder: {YES / LL:76919}  Urinary Catheter: {Urinary Catheter:144207162}   Colostomy/Ileostomy/Ileal Conduit: {YES / XL:26291}       Date of Last BM: ***    Intake/Output Summary (Last 24 hours) at 4/8/2022 1129  Last data filed at 4/7/2022 2206  Gross per 24 hour   Intake 480 ml   Output --   Net 480 ml     I/O last 3 completed shifts:   In: 18 [P.O.:480]  Out: -     Safety Concerns:     508 Footnote Safety Concerns:076990612}    Impairments/Disabilities:      508 Footnote Impairments/Disabilities:240012153}    Nutrition Therapy:  Current Nutrition Therapy:   508 Footnote Diet List:988097064}    Routes of Feeding: {[facility] DME Other Feedings:653022131}  Liquids: {Slp liquid thickness:96377}  Daily Fluid Restriction: {CHP DME Yes amt example:306704648}  Last Modified Barium Swallow with Video (Video Swallowing Test): {Done Not Done AQOI:428267576}    Treatments at the Time of Hospital Discharge:   Respiratory Treatments: ***  Oxygen Therapy:  {Therapy; copd oxygen:38699}  Ventilator:    {MH CC Vent TMFP:649744919}    Rehab Therapies: {THERAPEUTIC INTERVENTION:6521060974}  Weight Bearing Status/Restrictions: 50Sunny BURGESS Weight Bearin}  Other Medical Equipment (for information only, NOT a DME order):  {EQUIPMENT:647181221}  Other Treatments: ***    Patient's personal belongings (please select all that are sent with patient):  {[facility] DME Belongings:734711705}    RN SIGNATURE:  {Esignature:893238191}    CASE MANAGEMENT/SOCIAL WORK SECTION    Inpatient Status Date: ***    Readmission Risk Assessment Score:  Readmission Risk              Risk of Unplanned Readmission:  7           Discharging to Facility/ Agency   Name:   Address:  Phone:  Fax:    Dialysis Facility (if applicable)   Name:  Address:  Dialysis Schedule:  Phone:  Fax:    / signature: {Esignature:963189412}    PHYSICIAN SECTION    Prognosis: {Prognosis:8572123396}    Condition at Discharge: 50Sunny Ann Patient Condition:472707964}    Rehab Potential (if transferring to Rehab): {Prognosis:3077551382}    Recommended Labs or Other Treatments After Discharge: ***    Physician Certification: I certify the above information and transfer of Jeromy Abdi  is necessary for the continuing treatment of the diagnosis listed and that she requires {Admit to Appropriate Level of Care:41907} for {GREATER/LESS:815059246} 30 days.      Update Admission H&P: {P DME Changes in EAZZR:146218526}    PHYSICIAN SIGNATURE:  {Esignature:689815607}

## 2022-04-08 NOTE — CARE COORDINATION
Reviewed meds and d/c instructions with patient. No questions/concerns at this time. Patient going to shower and this RN will do daily dressing change on right forearm. Will send patient with supplies until followup appt this coming week.       Tomasa Odonnell RN

## 2022-04-09 LAB
CULTURE: ABNORMAL
CULTURE: ABNORMAL
CULTURE: NORMAL
CULTURE: NORMAL
Lab: ABNORMAL
Lab: NORMAL
Lab: NORMAL
SPECIMEN: ABNORMAL
SPECIMEN: NORMAL
SPECIMEN: NORMAL

## 2024-12-04 ENCOUNTER — APPOINTMENT (OUTPATIENT)
Dept: CT IMAGING | Age: 44
DRG: 872 | End: 2024-12-04
Payer: COMMERCIAL

## 2024-12-04 ENCOUNTER — HOSPITAL ENCOUNTER (INPATIENT)
Age: 44
LOS: 3 days | Discharge: HOME | DRG: 872 | End: 2024-12-08
Attending: STUDENT IN AN ORGANIZED HEALTH CARE EDUCATION/TRAINING PROGRAM | Admitting: STUDENT IN AN ORGANIZED HEALTH CARE EDUCATION/TRAINING PROGRAM
Payer: COMMERCIAL

## 2024-12-04 DIAGNOSIS — E87.6 HYPOKALEMIA: ICD-10-CM

## 2024-12-04 DIAGNOSIS — A41.9 SEPTICEMIA (HCC): ICD-10-CM

## 2024-12-04 DIAGNOSIS — N12 PYELONEPHRITIS: Primary | ICD-10-CM

## 2024-12-04 LAB
ALBUMIN SERPL-MCNC: 3.5 G/DL (ref 3.4–5)
ALBUMIN/GLOB SERPL: 1.1 {RATIO} (ref 1.1–2.2)
ALP SERPL-CCNC: 75 U/L (ref 40–129)
ALT SERPL-CCNC: 26 U/L (ref 10–40)
ANION GAP SERPL CALCULATED.3IONS-SCNC: 10 MMOL/L (ref 9–17)
AST SERPL-CCNC: 32 U/L (ref 15–37)
B-HCG SERPL EIA 3RD IS-ACNC: <1 MIU/ML
BASOPHILS # BLD: 0.06 K/UL
BASOPHILS NFR BLD: 0 % (ref 0–1)
BILIRUB DIRECT SERPL-MCNC: 0.2 MG/DL (ref 0–0.3)
BILIRUB INDIRECT SERPL-MCNC: 0.2 MG/DL (ref 0–0.7)
BILIRUB SERPL-MCNC: 0.4 MG/DL (ref 0–1)
BUN SERPL-MCNC: 10 MG/DL (ref 7–20)
CALCIUM SERPL-MCNC: 9 MG/DL (ref 8.3–10.6)
CHLORIDE SERPL-SCNC: 97 MMOL/L (ref 99–110)
CO2 SERPL-SCNC: 25 MMOL/L (ref 21–32)
CREAT SERPL-MCNC: 0.8 MG/DL (ref 0.6–1.1)
EOSINOPHIL # BLD: 0 K/UL
EOSINOPHILS RELATIVE PERCENT: 0 % (ref 0–3)
ERYTHROCYTE [DISTWIDTH] IN BLOOD BY AUTOMATED COUNT: 13.3 % (ref 11.7–14.9)
GFR, ESTIMATED: 79 ML/MIN/1.73M2
GLUCOSE SERPL-MCNC: 117 MG/DL (ref 74–99)
HCT VFR BLD AUTO: 39.3 % (ref 37–47)
HGB BLD-MCNC: 13 G/DL (ref 12.5–16)
IMM GRANULOCYTES # BLD AUTO: 0.16 K/UL
IMM GRANULOCYTES NFR BLD: 1 %
LACTATE BLDV-SCNC: 1 MMOL/L (ref 0.4–2)
LIPASE SERPL-CCNC: 14 U/L (ref 13–60)
LYMPHOCYTES NFR BLD: 1.37 K/UL
LYMPHOCYTES RELATIVE PERCENT: 7 % (ref 24–44)
MCH RBC QN AUTO: 30.6 PG (ref 27–31)
MCHC RBC AUTO-ENTMCNC: 33.1 G/DL (ref 32–36)
MCV RBC AUTO: 92.5 FL (ref 78–100)
MONOCYTES NFR BLD: 10 % (ref 0–4)
MONOCYTES NFR BLD: 2.01 K/UL
NEUTROPHILS NFR BLD: 82 % (ref 36–66)
NEUTS SEG NFR BLD: 16.07 K/UL
PLATELET # BLD AUTO: 283 K/UL (ref 140–440)
PMV BLD AUTO: 9.2 FL (ref 7.5–11.1)
POTASSIUM SERPL-SCNC: 3.3 MMOL/L (ref 3.5–5.1)
PROT SERPL-MCNC: 6.7 G/DL (ref 6.4–8.2)
RBC # BLD AUTO: 4.25 M/UL (ref 4.2–5.4)
SODIUM SERPL-SCNC: 132 MMOL/L (ref 136–145)
WBC OTHER # BLD: 19.7 K/UL (ref 4–10.5)

## 2024-12-04 PROCEDURE — 82248 BILIRUBIN DIRECT: CPT

## 2024-12-04 PROCEDURE — 2580000003 HC RX 258: Performed by: PHYSICIAN ASSISTANT

## 2024-12-04 PROCEDURE — 6360000002 HC RX W HCPCS: Performed by: PHYSICIAN ASSISTANT

## 2024-12-04 PROCEDURE — 96374 THER/PROPH/DIAG INJ IV PUSH: CPT

## 2024-12-04 PROCEDURE — 83605 ASSAY OF LACTIC ACID: CPT

## 2024-12-04 PROCEDURE — 83690 ASSAY OF LIPASE: CPT

## 2024-12-04 PROCEDURE — 80053 COMPREHEN METABOLIC PANEL: CPT

## 2024-12-04 PROCEDURE — 87186 SC STD MICRODIL/AGAR DIL: CPT

## 2024-12-04 PROCEDURE — 84702 CHORIONIC GONADOTROPIN TEST: CPT

## 2024-12-04 PROCEDURE — 85025 COMPLETE CBC W/AUTO DIFF WBC: CPT

## 2024-12-04 PROCEDURE — 6360000004 HC RX CONTRAST MEDICATION: Performed by: PHYSICIAN ASSISTANT

## 2024-12-04 PROCEDURE — 87040 BLOOD CULTURE FOR BACTERIA: CPT

## 2024-12-04 PROCEDURE — 74177 CT ABD & PELVIS W/CONTRAST: CPT

## 2024-12-04 PROCEDURE — 99285 EMERGENCY DEPT VISIT HI MDM: CPT

## 2024-12-04 PROCEDURE — 96375 TX/PRO/DX INJ NEW DRUG ADDON: CPT

## 2024-12-04 RX ORDER — 0.9 % SODIUM CHLORIDE 0.9 %
1000 INTRAVENOUS SOLUTION INTRAVENOUS ONCE
Status: COMPLETED | OUTPATIENT
Start: 2024-12-04 | End: 2024-12-04

## 2024-12-04 RX ORDER — MORPHINE SULFATE 4 MG/ML
4 INJECTION, SOLUTION INTRAMUSCULAR; INTRAVENOUS
Status: DISCONTINUED | OUTPATIENT
Start: 2024-12-04 | End: 2024-12-05

## 2024-12-04 RX ORDER — KETOROLAC TROMETHAMINE 15 MG/ML
15 INJECTION, SOLUTION INTRAMUSCULAR; INTRAVENOUS ONCE
Status: COMPLETED | OUTPATIENT
Start: 2024-12-04 | End: 2024-12-04

## 2024-12-04 RX ORDER — IOPAMIDOL 755 MG/ML
75 INJECTION, SOLUTION INTRAVASCULAR
Status: COMPLETED | OUTPATIENT
Start: 2024-12-04 | End: 2024-12-04

## 2024-12-04 RX ADMIN — SODIUM CHLORIDE 1000 ML: 9 INJECTION, SOLUTION INTRAVENOUS at 21:18

## 2024-12-04 RX ADMIN — IOPAMIDOL 75 ML: 755 INJECTION, SOLUTION INTRAVENOUS at 23:30

## 2024-12-04 RX ADMIN — MORPHINE SULFATE 4 MG: 4 INJECTION, SOLUTION INTRAMUSCULAR; INTRAVENOUS at 21:18

## 2024-12-04 RX ADMIN — KETOROLAC TROMETHAMINE 15 MG: 15 INJECTION, SOLUTION INTRAMUSCULAR; INTRAVENOUS at 21:18

## 2024-12-04 ASSESSMENT — PAIN DESCRIPTION - LOCATION
LOCATION: FLANK
LOCATION: ABDOMEN;BACK

## 2024-12-04 ASSESSMENT — PAIN - FUNCTIONAL ASSESSMENT
PAIN_FUNCTIONAL_ASSESSMENT: ACTIVITIES ARE NOT PREVENTED
PAIN_FUNCTIONAL_ASSESSMENT: 0-10

## 2024-12-04 ASSESSMENT — PAIN SCALES - GENERAL
PAINLEVEL_OUTOF10: 10
PAINLEVEL_OUTOF10: 10

## 2024-12-04 ASSESSMENT — LIFESTYLE VARIABLES
HOW MANY STANDARD DRINKS CONTAINING ALCOHOL DO YOU HAVE ON A TYPICAL DAY: PATIENT DOES NOT DRINK
HOW OFTEN DO YOU HAVE A DRINK CONTAINING ALCOHOL: NEVER

## 2024-12-04 ASSESSMENT — PAIN DESCRIPTION - PAIN TYPE: TYPE: ACUTE PAIN

## 2024-12-04 ASSESSMENT — PAIN DESCRIPTION - ORIENTATION: ORIENTATION: MID

## 2024-12-04 ASSESSMENT — PAIN DESCRIPTION - DESCRIPTORS: DESCRIPTORS: SHARP;STABBING

## 2024-12-05 PROBLEM — A41.9 SEPSIS (HCC): Status: ACTIVE | Noted: 2024-12-05

## 2024-12-05 LAB
ALBUMIN SERPL-MCNC: 3.4 G/DL (ref 3.4–5)
ALBUMIN/GLOB SERPL: 1.1 {RATIO} (ref 1.1–2.2)
ALP SERPL-CCNC: 81 U/L (ref 40–129)
ALT SERPL-CCNC: 25 U/L (ref 10–40)
AMPHET UR QL SCN: POSITIVE
ANION GAP SERPL CALCULATED.3IONS-SCNC: 10 MMOL/L (ref 9–17)
AST SERPL-CCNC: 32 U/L (ref 15–37)
BACTERIA URNS QL MICRO: ABNORMAL
BARBITURATES UR QL SCN: NEGATIVE
BASOPHILS # BLD: 0.06 K/UL
BASOPHILS NFR BLD: 0 % (ref 0–1)
BENZODIAZ UR QL: NEGATIVE
BILIRUB SERPL-MCNC: 0.4 MG/DL (ref 0–1)
BILIRUB UR QL STRIP: NEGATIVE
BUN SERPL-MCNC: 12 MG/DL (ref 7–20)
CALCIUM SERPL-MCNC: 8.5 MG/DL (ref 8.3–10.6)
CANNABINOIDS UR QL SCN: POSITIVE
CHLAMYDIA TRACHOMATIS MOLECULAR: NOT DETECTED
CHLORIDE SERPL-SCNC: 100 MMOL/L (ref 99–110)
CLARITY UR: CLEAR
CO2 SERPL-SCNC: 22 MMOL/L (ref 21–32)
COCAINE UR QL SCN: POSITIVE
COLOR UR: YELLOW
CREAT SERPL-MCNC: 0.8 MG/DL (ref 0.6–1.1)
EOSINOPHIL # BLD: 0.02 K/UL
EOSINOPHILS RELATIVE PERCENT: 0 % (ref 0–3)
EPI CELLS #/AREA URNS HPF: 6 /HPF
ERYTHROCYTE [DISTWIDTH] IN BLOOD BY AUTOMATED COUNT: 13.4 % (ref 11.7–14.9)
FENTANYL UR QL: NEGATIVE
GFR, ESTIMATED: 83 ML/MIN/1.73M2
GLUCOSE SERPL-MCNC: 100 MG/DL (ref 74–99)
GLUCOSE UR STRIP-MCNC: NEGATIVE MG/DL
HCT VFR BLD AUTO: 39.7 % (ref 37–47)
HGB BLD-MCNC: 12.6 G/DL (ref 12.5–16)
HGB UR QL STRIP.AUTO: ABNORMAL
IMM GRANULOCYTES # BLD AUTO: 0.25 K/UL
IMM GRANULOCYTES NFR BLD: 1 %
INR PPP: 1
KETONES UR STRIP-MCNC: NEGATIVE MG/DL
LEUKOCYTE ESTERASE UR QL STRIP: ABNORMAL
LYMPHOCYTES NFR BLD: 1.06 K/UL
LYMPHOCYTES RELATIVE PERCENT: 6 % (ref 24–44)
MCH RBC QN AUTO: 30.7 PG (ref 27–31)
MCHC RBC AUTO-ENTMCNC: 31.7 G/DL (ref 32–36)
MCV RBC AUTO: 96.6 FL (ref 78–100)
MONOCYTES NFR BLD: 13 % (ref 0–4)
MONOCYTES NFR BLD: 2.42 K/UL
NEISSERIA GONORRHOEAE MOLECULAR: NOT DETECTED
NEUTROPHILS NFR BLD: 80 % (ref 36–66)
NEUTS SEG NFR BLD: 14.98 K/UL
NITRITE UR QL STRIP: POSITIVE
OPIATES UR QL SCN: POSITIVE
OXYCODONE UR QL SCN: NEGATIVE
PH UR STRIP: 5.5 [PH] (ref 5–8)
PLATELET # BLD AUTO: 236 K/UL (ref 140–440)
PMV BLD AUTO: 9.4 FL (ref 7.5–11.1)
POTASSIUM SERPL-SCNC: 4 MMOL/L (ref 3.5–5.1)
PROT SERPL-MCNC: 6.4 G/DL (ref 6.4–8.2)
PROT UR STRIP-MCNC: 30 MG/DL
PROTHROMBIN TIME: 13.7 SEC (ref 11.7–14.5)
RBC # BLD AUTO: 4.11 M/UL (ref 4.2–5.4)
RBC #/AREA URNS HPF: 10 /HPF (ref 0–2)
SODIUM SERPL-SCNC: 132 MMOL/L (ref 136–145)
SOURCE: NORMAL
SP GR UR STRIP: 1.01 (ref 1–1.03)
TEST INFORMATION: ABNORMAL
TRICHOMONAS #/AREA URNS HPF: ABNORMAL /[HPF]
TRICHOMONAS VAGINALI, MOLECULAR: NOT DETECTED
UROBILINOGEN UR STRIP-ACNC: 4 EU/DL (ref 0–1)
WBC #/AREA URNS HPF: 12 /HPF (ref 0–5)
WBC OTHER # BLD: 18.8 K/UL (ref 4–10.5)

## 2024-12-05 PROCEDURE — 80307 DRUG TEST PRSMV CHEM ANLYZR: CPT

## 2024-12-05 PROCEDURE — 96376 TX/PRO/DX INJ SAME DRUG ADON: CPT

## 2024-12-05 PROCEDURE — 87491 CHLMYD TRACH DNA AMP PROBE: CPT

## 2024-12-05 PROCEDURE — 85610 PROTHROMBIN TIME: CPT

## 2024-12-05 PROCEDURE — 94761 N-INVAS EAR/PLS OXIMETRY MLT: CPT

## 2024-12-05 PROCEDURE — 80053 COMPREHEN METABOLIC PANEL: CPT

## 2024-12-05 PROCEDURE — 87086 URINE CULTURE/COLONY COUNT: CPT

## 2024-12-05 PROCEDURE — 81001 URINALYSIS AUTO W/SCOPE: CPT

## 2024-12-05 PROCEDURE — 6370000000 HC RX 637 (ALT 250 FOR IP): Performed by: STUDENT IN AN ORGANIZED HEALTH CARE EDUCATION/TRAINING PROGRAM

## 2024-12-05 PROCEDURE — 2580000003 HC RX 258: Performed by: STUDENT IN AN ORGANIZED HEALTH CARE EDUCATION/TRAINING PROGRAM

## 2024-12-05 PROCEDURE — 6370000000 HC RX 637 (ALT 250 FOR IP): Performed by: SURGERY

## 2024-12-05 PROCEDURE — 6360000002 HC RX W HCPCS: Performed by: STUDENT IN AN ORGANIZED HEALTH CARE EDUCATION/TRAINING PROGRAM

## 2024-12-05 PROCEDURE — 85025 COMPLETE CBC W/AUTO DIFF WBC: CPT

## 2024-12-05 PROCEDURE — 99222 1ST HOSP IP/OBS MODERATE 55: CPT | Performed by: SURGERY

## 2024-12-05 PROCEDURE — 6370000000 HC RX 637 (ALT 250 FOR IP): Performed by: PHYSICIAN ASSISTANT

## 2024-12-05 PROCEDURE — 2580000003 HC RX 258: Performed by: PHYSICIAN ASSISTANT

## 2024-12-05 PROCEDURE — 96375 TX/PRO/DX INJ NEW DRUG ADDON: CPT

## 2024-12-05 PROCEDURE — 2580000003 HC RX 258: Performed by: FAMILY MEDICINE

## 2024-12-05 PROCEDURE — 6360000002 HC RX W HCPCS: Performed by: PHYSICIAN ASSISTANT

## 2024-12-05 PROCEDURE — 87591 N.GONORRHOEAE DNA AMP PROB: CPT

## 2024-12-05 PROCEDURE — 87077 CULTURE AEROBIC IDENTIFY: CPT

## 2024-12-05 PROCEDURE — 1200000000 HC SEMI PRIVATE

## 2024-12-05 PROCEDURE — 87186 SC STD MICRODIL/AGAR DIL: CPT

## 2024-12-05 PROCEDURE — 6370000000 HC RX 637 (ALT 250 FOR IP): Performed by: FAMILY MEDICINE

## 2024-12-05 RX ORDER — BUPRENORPHINE AND NALOXONE 8; 2 MG/1; MG/1
1 FILM, SOLUBLE BUCCAL; SUBLINGUAL 3 TIMES DAILY
Status: DISCONTINUED | OUTPATIENT
Start: 2024-12-05 | End: 2024-12-08 | Stop reason: HOSPADM

## 2024-12-05 RX ORDER — SODIUM CHLORIDE 9 MG/ML
INJECTION, SOLUTION INTRAVENOUS CONTINUOUS
Status: DISCONTINUED | OUTPATIENT
Start: 2024-12-05 | End: 2024-12-08 | Stop reason: HOSPADM

## 2024-12-05 RX ORDER — POLYETHYLENE GLYCOL 3350 17 G/17G
17 POWDER, FOR SOLUTION ORAL 3 TIMES DAILY
Status: DISPENSED | OUTPATIENT
Start: 2024-12-05 | End: 2024-12-07

## 2024-12-05 RX ORDER — ACETAMINOPHEN 325 MG/1
325 TABLET ORAL ONCE
Status: COMPLETED | OUTPATIENT
Start: 2024-12-05 | End: 2024-12-05

## 2024-12-05 RX ORDER — LAMOTRIGINE 25 MG/1
25 TABLET ORAL 3 TIMES DAILY
Status: DISCONTINUED | OUTPATIENT
Start: 2024-12-05 | End: 2024-12-08 | Stop reason: HOSPADM

## 2024-12-05 RX ORDER — MORPHINE SULFATE 4 MG/ML
4 INJECTION, SOLUTION INTRAMUSCULAR; INTRAVENOUS EVERY 4 HOURS PRN
Status: DISCONTINUED | OUTPATIENT
Start: 2024-12-05 | End: 2024-12-05

## 2024-12-05 RX ORDER — MAGNESIUM SULFATE IN WATER 40 MG/ML
2000 INJECTION, SOLUTION INTRAVENOUS PRN
Status: DISCONTINUED | OUTPATIENT
Start: 2024-12-05 | End: 2024-12-08 | Stop reason: HOSPADM

## 2024-12-05 RX ORDER — ONDANSETRON 4 MG/1
4 TABLET, ORALLY DISINTEGRATING ORAL EVERY 8 HOURS PRN
Status: DISCONTINUED | OUTPATIENT
Start: 2024-12-05 | End: 2024-12-08 | Stop reason: HOSPADM

## 2024-12-05 RX ORDER — ONDANSETRON 2 MG/ML
4 INJECTION INTRAMUSCULAR; INTRAVENOUS EVERY 6 HOURS PRN
Status: DISCONTINUED | OUTPATIENT
Start: 2024-12-05 | End: 2024-12-08 | Stop reason: HOSPADM

## 2024-12-05 RX ORDER — POTASSIUM CHLORIDE 7.45 MG/ML
10 INJECTION INTRAVENOUS PRN
Status: DISCONTINUED | OUTPATIENT
Start: 2024-12-05 | End: 2024-12-08 | Stop reason: HOSPADM

## 2024-12-05 RX ORDER — SODIUM CHLORIDE, SODIUM LACTATE, POTASSIUM CHLORIDE, CALCIUM CHLORIDE 600; 310; 30; 20 MG/100ML; MG/100ML; MG/100ML; MG/100ML
INJECTION, SOLUTION INTRAVENOUS CONTINUOUS
Status: ACTIVE | OUTPATIENT
Start: 2024-12-05 | End: 2024-12-05

## 2024-12-05 RX ORDER — ARIPIPRAZOLE 5 MG/1
5 TABLET ORAL NIGHTLY
Status: DISCONTINUED | OUTPATIENT
Start: 2024-12-05 | End: 2024-12-08 | Stop reason: HOSPADM

## 2024-12-05 RX ORDER — ACETAMINOPHEN 650 MG/1
650 SUPPOSITORY RECTAL EVERY 6 HOURS PRN
Status: DISCONTINUED | OUTPATIENT
Start: 2024-12-05 | End: 2024-12-08 | Stop reason: HOSPADM

## 2024-12-05 RX ORDER — KETOROLAC TROMETHAMINE 15 MG/ML
15 INJECTION, SOLUTION INTRAMUSCULAR; INTRAVENOUS EVERY 6 HOURS PRN
Status: DISCONTINUED | OUTPATIENT
Start: 2024-12-05 | End: 2024-12-08 | Stop reason: HOSPADM

## 2024-12-05 RX ORDER — LAMOTRIGINE 25 MG/1
25 TABLET ORAL NIGHTLY
COMMUNITY

## 2024-12-05 RX ORDER — GABAPENTIN 400 MG/1
800 CAPSULE ORAL 3 TIMES DAILY
Status: DISCONTINUED | OUTPATIENT
Start: 2024-12-05 | End: 2024-12-08 | Stop reason: HOSPADM

## 2024-12-05 RX ORDER — SODIUM CHLORIDE 0.9 % (FLUSH) 0.9 %
5-40 SYRINGE (ML) INJECTION PRN
Status: DISCONTINUED | OUTPATIENT
Start: 2024-12-05 | End: 2024-12-08 | Stop reason: HOSPADM

## 2024-12-05 RX ORDER — SODIUM CHLORIDE 0.9 % (FLUSH) 0.9 %
5-40 SYRINGE (ML) INJECTION EVERY 12 HOURS SCHEDULED
Status: DISCONTINUED | OUTPATIENT
Start: 2024-12-05 | End: 2024-12-08 | Stop reason: HOSPADM

## 2024-12-05 RX ORDER — LAMOTRIGINE 25 MG/1
50 TABLET ORAL EVERY MORNING
COMMUNITY

## 2024-12-05 RX ORDER — ACETAMINOPHEN 325 MG/1
650 TABLET ORAL EVERY 6 HOURS PRN
Status: DISCONTINUED | OUTPATIENT
Start: 2024-12-05 | End: 2024-12-08 | Stop reason: HOSPADM

## 2024-12-05 RX ORDER — POTASSIUM CHLORIDE 7.45 MG/ML
10 INJECTION INTRAVENOUS
Status: COMPLETED | OUTPATIENT
Start: 2024-12-05 | End: 2024-12-05

## 2024-12-05 RX ORDER — SODIUM CHLORIDE 9 MG/ML
INJECTION, SOLUTION INTRAVENOUS PRN
Status: DISCONTINUED | OUTPATIENT
Start: 2024-12-05 | End: 2024-12-08 | Stop reason: HOSPADM

## 2024-12-05 RX ORDER — ARIPIPRAZOLE 5 MG/1
5 TABLET ORAL NIGHTLY
COMMUNITY

## 2024-12-05 RX ORDER — FLUTICASONE PROPIONATE 50 MCG
1 SPRAY, SUSPENSION (ML) NASAL DAILY PRN
Status: DISCONTINUED | OUTPATIENT
Start: 2024-12-05 | End: 2024-12-08 | Stop reason: HOSPADM

## 2024-12-05 RX ORDER — ENOXAPARIN SODIUM 100 MG/ML
40 INJECTION SUBCUTANEOUS DAILY
Status: DISCONTINUED | OUTPATIENT
Start: 2024-12-05 | End: 2024-12-08 | Stop reason: HOSPADM

## 2024-12-05 RX ORDER — POTASSIUM CHLORIDE 1500 MG/1
40 TABLET, EXTENDED RELEASE ORAL ONCE
Status: COMPLETED | OUTPATIENT
Start: 2024-12-05 | End: 2024-12-05

## 2024-12-05 RX ORDER — POLYETHYLENE GLYCOL 3350 17 G/17G
17 POWDER, FOR SOLUTION ORAL DAILY PRN
Status: DISCONTINUED | OUTPATIENT
Start: 2024-12-05 | End: 2024-12-08 | Stop reason: HOSPADM

## 2024-12-05 RX ORDER — PANTOPRAZOLE SODIUM 40 MG/10ML
40 INJECTION, POWDER, LYOPHILIZED, FOR SOLUTION INTRAVENOUS ONCE
Status: COMPLETED | OUTPATIENT
Start: 2024-12-05 | End: 2024-12-05

## 2024-12-05 RX ORDER — POLYETHYLENE GLYCOL 3350 17 G/17G
17 POWDER, FOR SOLUTION ORAL DAILY
Status: DISCONTINUED | OUTPATIENT
Start: 2024-12-05 | End: 2024-12-05 | Stop reason: SDUPTHER

## 2024-12-05 RX ORDER — SENNOSIDES A AND B 8.6 MG/1
1 TABLET, FILM COATED ORAL NIGHTLY
Status: DISCONTINUED | OUTPATIENT
Start: 2024-12-05 | End: 2024-12-08 | Stop reason: HOSPADM

## 2024-12-05 RX ORDER — HYDROCODONE BITARTRATE AND ACETAMINOPHEN 5; 325 MG/1; MG/1
1 TABLET ORAL EVERY 6 HOURS PRN
Status: DISCONTINUED | OUTPATIENT
Start: 2024-12-05 | End: 2024-12-05

## 2024-12-05 RX ORDER — POTASSIUM CHLORIDE 1500 MG/1
40 TABLET, EXTENDED RELEASE ORAL PRN
Status: DISCONTINUED | OUTPATIENT
Start: 2024-12-05 | End: 2024-12-08 | Stop reason: HOSPADM

## 2024-12-05 RX ADMIN — SODIUM CHLORIDE, PRESERVATIVE FREE 10 ML: 5 INJECTION INTRAVENOUS at 08:00

## 2024-12-05 RX ADMIN — MORPHINE SULFATE 4 MG: 4 INJECTION, SOLUTION INTRAMUSCULAR; INTRAVENOUS at 01:15

## 2024-12-05 RX ADMIN — LAMOTRIGINE 25 MG: 25 TABLET ORAL at 16:28

## 2024-12-05 RX ADMIN — GABAPENTIN 800 MG: 400 CAPSULE ORAL at 22:17

## 2024-12-05 RX ADMIN — POTASSIUM CHLORIDE 10 MEQ: 7.45 INJECTION INTRAVENOUS at 06:28

## 2024-12-05 RX ADMIN — SODIUM CHLORIDE, PRESERVATIVE FREE 10 ML: 5 INJECTION INTRAVENOUS at 22:21

## 2024-12-05 RX ADMIN — WATER 1000 MG: 1 INJECTION INTRAMUSCULAR; INTRAVENOUS; SUBCUTANEOUS at 01:01

## 2024-12-05 RX ADMIN — POLYETHYLENE GLYCOL (3350) 17 G: 17 POWDER, FOR SOLUTION ORAL at 22:17

## 2024-12-05 RX ADMIN — GABAPENTIN 800 MG: 400 CAPSULE ORAL at 08:00

## 2024-12-05 RX ADMIN — SODIUM CHLORIDE, POTASSIUM CHLORIDE, SODIUM LACTATE AND CALCIUM CHLORIDE: 600; 310; 30; 20 INJECTION, SOLUTION INTRAVENOUS at 06:30

## 2024-12-05 RX ADMIN — ENOXAPARIN SODIUM 40 MG: 100 INJECTION SUBCUTANEOUS at 08:00

## 2024-12-05 RX ADMIN — MORPHINE SULFATE 4 MG: 4 INJECTION, SOLUTION INTRAMUSCULAR; INTRAVENOUS at 04:10

## 2024-12-05 RX ADMIN — ARIPIPRAZOLE 5 MG: 5 TABLET ORAL at 22:17

## 2024-12-05 RX ADMIN — POLYETHYLENE GLYCOL (3350) 17 G: 17 POWDER, FOR SOLUTION ORAL at 16:28

## 2024-12-05 RX ADMIN — SODIUM CHLORIDE: 9 INJECTION, SOLUTION INTRAVENOUS at 22:52

## 2024-12-05 RX ADMIN — SENNOSIDES 8.6 MG: 8.6 TABLET, FILM COATED ORAL at 22:17

## 2024-12-05 RX ADMIN — POLYETHYLENE GLYCOL (3350) 17 G: 17 POWDER, FOR SOLUTION ORAL at 08:00

## 2024-12-05 RX ADMIN — ACETAMINOPHEN 650 MG: 325 TABLET ORAL at 08:07

## 2024-12-05 RX ADMIN — GABAPENTIN 800 MG: 400 CAPSULE ORAL at 16:28

## 2024-12-05 RX ADMIN — PANTOPRAZOLE SODIUM 40 MG: 40 INJECTION, POWDER, FOR SOLUTION INTRAVENOUS at 01:36

## 2024-12-05 RX ADMIN — ACETAMINOPHEN 325 MG: 325 TABLET ORAL at 22:46

## 2024-12-05 RX ADMIN — LAMOTRIGINE 25 MG: 25 TABLET ORAL at 08:00

## 2024-12-05 RX ADMIN — POTASSIUM CHLORIDE 10 MEQ: 7.45 INJECTION INTRAVENOUS at 04:39

## 2024-12-05 RX ADMIN — ACETAMINOPHEN 650 MG: 325 TABLET ORAL at 22:17

## 2024-12-05 RX ADMIN — KETOROLAC TROMETHAMINE 15 MG: 15 INJECTION, SOLUTION INTRAMUSCULAR; INTRAVENOUS at 13:25

## 2024-12-05 RX ADMIN — POTASSIUM CHLORIDE 40 MEQ: 1500 TABLET, EXTENDED RELEASE ORAL at 01:36

## 2024-12-05 RX ADMIN — LAMOTRIGINE 25 MG: 25 TABLET ORAL at 22:17

## 2024-12-05 ASSESSMENT — PAIN DESCRIPTION - LOCATION
LOCATION: BACK
LOCATION: HEAD
LOCATION: ABDOMEN
LOCATION: ABDOMEN
LOCATION: FLANK
LOCATION: FLANK

## 2024-12-05 ASSESSMENT — PAIN DESCRIPTION - ORIENTATION: ORIENTATION: RIGHT;LEFT

## 2024-12-05 ASSESSMENT — PAIN SCALES - GENERAL
PAINLEVEL_OUTOF10: 8
PAINLEVEL_OUTOF10: 7
PAINLEVEL_OUTOF10: 6
PAINLEVEL_OUTOF10: 4
PAINLEVEL_OUTOF10: 8
PAINLEVEL_OUTOF10: 6
PAINLEVEL_OUTOF10: 9
PAINLEVEL_OUTOF10: 7

## 2024-12-05 ASSESSMENT — PAIN DESCRIPTION - DESCRIPTORS: DESCRIPTORS: ACHING

## 2024-12-05 NOTE — H&P
Past Surgical History:   Procedure Laterality Date    ADENOIDECTOMY      CHOLECYSTECTOMY      COLONOSCOPY  04/07/2015    Polyp cecum, hemorrhoids    TONSILLECTOMY      TUBAL LIGATION         Allergies: No Known Allergies    FHx: family history includes No Known Problems in her mother; Stroke in her father.    SHx:   Social History     Socioeconomic History    Marital status:    Tobacco Use    Smoking status: Every Day     Current packs/day: 0.50     Types: Cigarettes    Smokeless tobacco: Never   Substance and Sexual Activity    Alcohol use: No    Drug use: Yes     Types: IV, Methamphetamines (Crystal Meth)     Comment: heroin    Sexual activity: Yes     Partners: Male     Social Determinants of Health     Physical Activity: Insufficiently Active (10/31/2024)    Exercise Vital Sign     Days of Exercise per Week: 3 days     Minutes of Exercise per Session: 30 min       Medications Prior to Admission     Prior to Admission medications    Medication Sig Start Date End Date Taking? Authorizing Provider   ARIPiprazole (ABILIFY) 5 MG tablet Take 1 tablet by mouth at bedtime   Yes Leda Colunga MD   lamoTRIgine (LAMICTAL) 25 MG CHEW chew tab Take 1 tablet by mouth in the morning, at noon, and at bedtime   Yes Leda Colunga MD   buprenorphine-naloxone (SUBOXONE) 8-2 MG FILM SL film Place 3 Film under the tongue daily. 3/15/22   Leda Colunga MD   cetirizine (ZYRTEC) 10 MG tablet Take 10 mg by mouth    Leda Colunga MD   gabapentin (NEURONTIN) 400 MG capsule Take 800 mg by mouth 3 times daily.    Leda Colunga MD       Medications:     Medications:        Infusions:       PRN Meds:   morphine, 4 mg, Q1H PRN        Data:     CBC:   Recent Labs     12/04/24 2112   WBC 19.7*   HGB 13.0      MCV 92.5   RDW 13.3   LYMPHOPCT 7*   MONOPCT 10*   EOSPCT 0   BASOPCT 0   MONOSABS 2.01   LYMPHSABS 1.37   EOSABS 0.00   BASOSABS 0.06     CMP:    Recent Labs     12/04/24  2112   NA

## 2024-12-05 NOTE — ED NOTES
ED TO INPATIENT SBAR HANDOFF    Patient Name: Natacha Escudero   :  1980  43 y.o.   Preferred Name  Natacha  Family/Caregiver Present no   Restraints no   C-SSRS: Risk of Suicide: No Risk  Sitter no   Sepsis Risk Score        Situation  Chief Complaint   Patient presents with    Flank Pain    Back Pain    Abdominal Pain     Abd pain, back/flank pain.   C/o fever at home x 4 days.   Also requesting to be checked for STD's      Brief Description of Patient's Condition: Pt came in for back pain and abdominal pain. Pt being admitted for Pyelonephritis Septicemia. Pt is Aox4 and independent of ADLs. See CT results  Mental Status: oriented, alert, coherent, logical, thought processes intact, and able to concentrate and follow conversation  Arrived from: home    Imaging:   CT ABDOMEN PELVIS W IV CONTRAST Additional Contrast? None   Final Result   1. Wedge-shaped areas of hypoattenuation involving the right renal cortex as   well as a small focal wedge-shaped area of hypoattenuation involving lower pole   of the left kidney. Additionally, there is right perinephric stranding. Findings   likely relate to acute pyelonephritis. No hydronephrosis or obstructive uropathy   seen.   2. Moderate retained stool in the colon. Additionally, there are air distended   loops of large bowel without obstruction. There is questionable pneumatosis   involving the cecum and proximal ascending colon wall. However, no portal venous   gas is seen.   3. Small right adrenal gland nodule likely relating to an adenoma.   4. Trace amount of free fluid in the posterior cul-de-sac of the pelvis may be   on a physiologic basis.   5. Additional findings as above.      Electronically signed by Romel Posada        Abnormal labs:   Abnormal Labs Reviewed   BASIC METABOLIC PANEL - Abnormal; Notable for the following components:       Result Value    Sodium 132 (*)     Potassium 3.3 (*)     Chloride 97 (*)     Glucose 117 (*)     All other components

## 2024-12-05 NOTE — ED NOTES
Medication History  [facility]    Patient Name: Natacha Escudero 1980     Medication history has been completed by: Faviola Lyn CPhT    Source(s) of information: patient and insurance claims     Primary Care Physician: No primary care provider on file.     Pharmacy: [facility]    Allergies as of 2024    (No Known Allergies)        Prior to Admission medications    Medication Sig Start Date End Date Taking? Authorizing Provider   ARIPiprazole (ABILIFY) 5 MG tablet Take 1 tablet by mouth at bedtime   Yes Leda Colunga MD   lamoTRIgine (LAMICTAL) 25 MG tablet Take 2 tablets by mouth every morning   Yes Leda Colunga MD   lamoTRIgine (LAMICTAL) 25 MG tablet Take 1 tablet by mouth nightly   Yes Leda Colunga MD   Turmeric (QC TUMERIC COMPLEX PO) Take 1 capsule by mouth daily OTC   Yes Leda Colunga MD   Cyanocobalamin (VITAMIN B 12 PO) Take 1 tablet by mouth daily OTC   Yes Leda Colunga MD   buprenorphine-naloxone (SUBOXONE) 8-2 MG FILM SL film Place 0.5 Film under the tongue 6 times daily. 3/15/22  Yes Leda Colunga MD   cetirizine (ZYRTEC) 10 MG tablet Take 10 mg by mouth    Leda Colunga MD   gabapentin (NEURONTIN) 800 MG tablet Take 1 tablet by mouth 3 times daily.    Leda Colunga MD     Medications added or changed (ex. new medication, dosage change, interval change, formulation change):  Suboxone verified per patient dosin/2 film 6 times daily (1 film ordered)  Lamotrigine dosage clarified per patient, 50 mg q am and 25 mg at HSc  Tumeric daily (added)  Vitamin B 12 (added)    Medications requiring reconciliation with provider:    Suboxone verified per patient dosin/2 film 6 times daily (1 film ordered)  Lamotrigine dosage clarified per patient, 50 mg q am and 25 mg at HS  Gabapentin last fill date 24 verified per [place] (ordered)    Comments:  Medication list reviewed with patient and insurance claims

## 2024-12-05 NOTE — ED PROVIDER NOTES
EMERGENCY DEPARTMENT ENCOUNTER        Pt Name: Natacha Escudero  MRN: [id number]  Birthdate 1980  Date of evaluation: 12/4/2024  Provider: Marcia Toure PA-C  PCP: No primary care provider on file.    MARIANO. I have evaluated this patient.        Triage CHIEF COMPLAINT       Chief Complaint   Patient presents with    Flank Pain    Back Pain    Abdominal Pain     Abd pain, back/flank pain.   C/o fever at home x 4 days.   Also requesting to be checked for STD's          HISTORY OF PRESENT ILLNESS      Chief Complaint: Flank pain    Natacha Escudero is a 43 y.o. female who presents for flank pain.  Patient states she started having UTI symptoms 2 weeks ago but decided to just manage her symptoms at home.  Over the last 4 days, she has developed bilateral flank pain.  She has also had chills and felt generally unwell.  She denies any measured fever.  Denies n/v/d.  She does have chronic constipation and has not taken her Miralax or Colace the last 4 days since she hasn't felt well.  She denies dysuria but does state her urine is dark in color.  She states she would also like STI testing as she just found out her significant other is cheating on her.  No vaginal discharge.      Nursing Notes were all reviewed and agreed with or any disagreements were addressed in the HPI.    REVIEW OF SYSTEMS     CONSTITUTIONAL:  Denies fever.  EYES:  Denies visual changes.  HEAD:  Denies headache.  ENT:  Denies earache, nasal congestion, sore throat.  NECK:  Denies neck pain.  RESPIRATORY:  Denies any shortness of breath.  CARDIOVASCULAR:  Denies chest pain.  GI:  Denies nausea or vomiting.    :  + urinary symptoms.  MUSCULOSKELETAL:  Denies extremity pain or swelling.  BACK:  Denies back pain.  INTEGUMENT:  Denies skin changes.  LYMPHATIC:  Denies lymphadenopathy.  NEUROLOGIC:  Denies any numbness/tingling.  PSYCHIATRIC:  Denies SI/HI.    PAST MEDICAL HISTORY     Past Medical History:   Diagnosis Date    Anxiety     Colon

## 2024-12-06 LAB
ALBUMIN SERPL-MCNC: 3.1 G/DL (ref 3.4–5)
ALBUMIN/GLOB SERPL: 1 {RATIO} (ref 1.1–2.2)
ALP SERPL-CCNC: 75 U/L (ref 40–129)
ALT SERPL-CCNC: 24 U/L (ref 10–40)
ANION GAP SERPL CALCULATED.3IONS-SCNC: 10 MMOL/L (ref 9–17)
AST SERPL-CCNC: 31 U/L (ref 15–37)
BASOPHILS # BLD: 0.03 K/UL
BASOPHILS NFR BLD: 0 % (ref 0–1)
BILIRUB SERPL-MCNC: 0.3 MG/DL (ref 0–1)
BUN SERPL-MCNC: 8 MG/DL (ref 7–20)
CALCIUM SERPL-MCNC: 8.3 MG/DL (ref 8.3–10.6)
CHLORIDE SERPL-SCNC: 102 MMOL/L (ref 99–110)
CO2 SERPL-SCNC: 21 MMOL/L (ref 21–32)
CREAT SERPL-MCNC: 0.6 MG/DL (ref 0.6–1.1)
EOSINOPHIL # BLD: 0.08 K/UL
EOSINOPHILS RELATIVE PERCENT: 1 % (ref 0–3)
ERYTHROCYTE [DISTWIDTH] IN BLOOD BY AUTOMATED COUNT: 13.3 % (ref 11.7–14.9)
GFR, ESTIMATED: >90 ML/MIN/1.73M2
GLUCOSE SERPL-MCNC: 87 MG/DL (ref 74–99)
HCT VFR BLD AUTO: 35.1 % (ref 37–47)
HGB BLD-MCNC: 11.4 G/DL (ref 12.5–16)
IMM GRANULOCYTES # BLD AUTO: 0.12 K/UL
IMM GRANULOCYTES NFR BLD: 1 %
LYMPHOCYTES NFR BLD: 1.68 K/UL
LYMPHOCYTES RELATIVE PERCENT: 13 % (ref 24–44)
MCH RBC QN AUTO: 30.4 PG (ref 27–31)
MCHC RBC AUTO-ENTMCNC: 32.5 G/DL (ref 32–36)
MCV RBC AUTO: 93.6 FL (ref 78–100)
MICROORGANISM SPEC CULT: ABNORMAL
MONOCYTES NFR BLD: 1.6 K/UL
MONOCYTES NFR BLD: 12 % (ref 0–4)
NEUTROPHILS NFR BLD: 73 % (ref 36–66)
NEUTS SEG NFR BLD: 9.53 K/UL
PLATELET # BLD AUTO: 247 K/UL (ref 140–440)
PMV BLD AUTO: 9.7 FL (ref 7.5–11.1)
POTASSIUM SERPL-SCNC: 4.2 MMOL/L (ref 3.5–5.1)
PROT SERPL-MCNC: 6 G/DL (ref 6.4–8.2)
RBC # BLD AUTO: 3.75 M/UL (ref 4.2–5.4)
SERVICE CMNT-IMP: ABNORMAL
SODIUM SERPL-SCNC: 133 MMOL/L (ref 136–145)
SPECIMEN DESCRIPTION: ABNORMAL
WBC OTHER # BLD: 13 K/UL (ref 4–10.5)

## 2024-12-06 PROCEDURE — 05HD33Z INSERTION OF INFUSION DEVICE INTO RIGHT CEPHALIC VEIN, PERCUTANEOUS APPROACH: ICD-10-PCS | Performed by: STUDENT IN AN ORGANIZED HEALTH CARE EDUCATION/TRAINING PROGRAM

## 2024-12-06 PROCEDURE — 94761 N-INVAS EAR/PLS OXIMETRY MLT: CPT

## 2024-12-06 PROCEDURE — 2709999900 HC NON-CHARGEABLE SUPPLY

## 2024-12-06 PROCEDURE — 85025 COMPLETE CBC W/AUTO DIFF WBC: CPT

## 2024-12-06 PROCEDURE — 6370000000 HC RX 637 (ALT 250 FOR IP): Performed by: STUDENT IN AN ORGANIZED HEALTH CARE EDUCATION/TRAINING PROGRAM

## 2024-12-06 PROCEDURE — 80053 COMPREHEN METABOLIC PANEL: CPT

## 2024-12-06 PROCEDURE — 2580000003 HC RX 258: Performed by: STUDENT IN AN ORGANIZED HEALTH CARE EDUCATION/TRAINING PROGRAM

## 2024-12-06 PROCEDURE — 6360000002 HC RX W HCPCS: Performed by: STUDENT IN AN ORGANIZED HEALTH CARE EDUCATION/TRAINING PROGRAM

## 2024-12-06 PROCEDURE — 6370000000 HC RX 637 (ALT 250 FOR IP): Performed by: SURGERY

## 2024-12-06 PROCEDURE — 36410 VNPNXR 3YR/> PHY/QHP DX/THER: CPT

## 2024-12-06 PROCEDURE — 76937 US GUIDE VASCULAR ACCESS: CPT

## 2024-12-06 PROCEDURE — 36415 COLL VENOUS BLD VENIPUNCTURE: CPT

## 2024-12-06 PROCEDURE — 1200000000 HC SEMI PRIVATE

## 2024-12-06 RX ORDER — BUTALBITAL, ACETAMINOPHEN AND CAFFEINE 50; 325; 40 MG/1; MG/1; MG/1
1 TABLET ORAL ONCE
Status: COMPLETED | OUTPATIENT
Start: 2024-12-06 | End: 2024-12-06

## 2024-12-06 RX ADMIN — GABAPENTIN 800 MG: 400 CAPSULE ORAL at 20:53

## 2024-12-06 RX ADMIN — WATER 1000 MG: 1 INJECTION INTRAMUSCULAR; INTRAVENOUS; SUBCUTANEOUS at 00:19

## 2024-12-06 RX ADMIN — GABAPENTIN 800 MG: 400 CAPSULE ORAL at 08:20

## 2024-12-06 RX ADMIN — LAMOTRIGINE 25 MG: 25 TABLET ORAL at 20:53

## 2024-12-06 RX ADMIN — LAMOTRIGINE 25 MG: 25 TABLET ORAL at 08:21

## 2024-12-06 RX ADMIN — KETOROLAC TROMETHAMINE 15 MG: 15 INJECTION, SOLUTION INTRAMUSCULAR; INTRAVENOUS at 16:46

## 2024-12-06 RX ADMIN — GABAPENTIN 800 MG: 400 CAPSULE ORAL at 15:56

## 2024-12-06 RX ADMIN — ENOXAPARIN SODIUM 40 MG: 100 INJECTION SUBCUTANEOUS at 08:21

## 2024-12-06 RX ADMIN — SENNOSIDES 8.6 MG: 8.6 TABLET, FILM COATED ORAL at 20:53

## 2024-12-06 RX ADMIN — SODIUM CHLORIDE, PRESERVATIVE FREE 10 ML: 5 INJECTION INTRAVENOUS at 11:21

## 2024-12-06 RX ADMIN — FLUTICASONE PROPIONATE 1 SPRAY: 50 SPRAY, METERED NASAL at 08:34

## 2024-12-06 RX ADMIN — ARIPIPRAZOLE 5 MG: 5 TABLET ORAL at 20:53

## 2024-12-06 RX ADMIN — KETOROLAC TROMETHAMINE 15 MG: 15 INJECTION, SOLUTION INTRAMUSCULAR; INTRAVENOUS at 09:21

## 2024-12-06 RX ADMIN — POLYETHYLENE GLYCOL (3350) 17 G: 17 POWDER, FOR SOLUTION ORAL at 16:51

## 2024-12-06 RX ADMIN — ACETAMINOPHEN 650 MG: 325 TABLET ORAL at 15:56

## 2024-12-06 RX ADMIN — BUTALBITAL, ACETAMINOPHEN AND CAFFEINE 1 TABLET: 325; 50; 40 TABLET ORAL at 17:58

## 2024-12-06 RX ADMIN — POLYETHYLENE GLYCOL (3350) 17 G: 17 POWDER, FOR SOLUTION ORAL at 08:21

## 2024-12-06 RX ADMIN — POLYETHYLENE GLYCOL (3350) 17 G: 17 POWDER, FOR SOLUTION ORAL at 20:57

## 2024-12-06 RX ADMIN — BUPRENORPHINE AND NALOXONE 1 FILM: 8; 2 FILM BUCCAL; SUBLINGUAL at 20:53

## 2024-12-06 RX ADMIN — SODIUM CHLORIDE, PRESERVATIVE FREE 10 ML: 5 INJECTION INTRAVENOUS at 20:57

## 2024-12-06 RX ADMIN — LAMOTRIGINE 25 MG: 25 TABLET ORAL at 15:56

## 2024-12-06 ASSESSMENT — PAIN DESCRIPTION - DESCRIPTORS
DESCRIPTORS: ACHING

## 2024-12-06 ASSESSMENT — PAIN DESCRIPTION - LOCATION
LOCATION: GENERALIZED
LOCATION: GENERALIZED
LOCATION: BACK
LOCATION: HEAD
LOCATION: BACK

## 2024-12-06 ASSESSMENT — PAIN SCALES - GENERAL
PAINLEVEL_OUTOF10: 9
PAINLEVEL_OUTOF10: 6
PAINLEVEL_OUTOF10: 9
PAINLEVEL_OUTOF10: 8
PAINLEVEL_OUTOF10: 9

## 2024-12-06 ASSESSMENT — PAIN DESCRIPTION - ORIENTATION
ORIENTATION: RIGHT;LEFT

## 2024-12-06 NOTE — CARE COORDINATION
Pt lives in a home.  Pt is independent of her ALDs.  Pt does not have a PCP.  PCP list placed on AVS.  Pt has a history of drug use.  Pt was positive for THC, Amphetamines, Cocaine and opiates.  LSW placed rehab resources on pt AVS.  If pt need IV anti-bio at discharge she will not be to go home with this due to her drug use.  If pt needs IV meds for 3 times a day a skilled SNF may be needed.  If she needs it once a day pt an come to the infusion clinic.  CM following.

## 2024-12-06 NOTE — CONSULTS
Department of GeneralSurgery   Surgical Service Dr. Scott   Consult Note    Date of Consult: 12/5/24         Reason for Consult: CT findings of possible pneumatosis  Requesting Physician: ED    CHIEF COMPLAINT: Flank pain    History Obtained From:  patient, electronic medical record    HISTORY OF PRESENT ILLNESS:                The patient is a 43 y.o. female who presents with flank pain.  Pain is bilateral.  Apparently has had progressing UTI and now in ED workup showed pyelonephritis.  Has associated frequency and fever/chills.  Also reports nausea.  Last bowel movement day prior to admission.  Has been passing flatus.  Denies abdominal pain.  CT with stool burden with questionable pneumatosis on right without portal venous gas.    Past Medical History:    Past Medical History:   Diagnosis Date    Anxiety     Colon polyp 4-7-2015    Hemorrhoids 4-7-2015    Hepatitis C     IV drug abuse (HCC)     Migraines        Past Surgical History:    Past Surgical History:   Procedure Laterality Date    ADENOIDECTOMY      CHOLECYSTECTOMY      COLONOSCOPY  04/07/2015    Polyp cecum, hemorrhoids    TONSILLECTOMY      TUBAL LIGATION         Current Medications:   Current Facility-Administered Medications   Medication Dose Route Frequency Provider Last Rate Last Admin    sodium chloride flush 0.9 % injection 5-40 mL  5-40 mL IntraVENous 2 times per day Amanda Jacques MD   10 mL at 12/05/24 0800    sodium chloride flush 0.9 % injection 5-40 mL  5-40 mL IntraVENous PRN Amanda Jacques MD        0.9 % sodium chloride infusion   IntraVENous PRN Amanda Jacques MD        potassium chloride (KLOR-CON M) extended release tablet 40 mEq  40 mEq Oral PRN Amanda Jacques MD        Or    potassium bicarb-citric acid (EFFER-K) effervescent tablet 40 mEq  40 mEq Oral PRN Amanda Jacques MD        Or    potassium chloride 10 mEq/100 mL IVPB (Peripheral Line)  10 mEq IntraVENous PRN Amanda Jacques MD        potassium chloride 10 mEq/100 mL IVPB

## 2024-12-06 NOTE — CONSULTS
Consult completed. Deemelo Safety® Deep Access 20g 2 ½\" IV catheter initiated into RUE cephalic vein x 1 attempt using ultrasound guided technique. Brisk blood return, flushes without resistance. Labs collected and sent at this time.  Patient tolerated well. Primary nurse KADE Bennett notified.      Consult the Vascular Access Team for questions, concerns, or change in patient's condition.

## 2024-12-06 NOTE — DISCHARGE INSTRUCTIONS
1. Call to schedule follow up hospital follow up appointment:   [facility]  [address]  [address]  [place], [place] [address]  Tel: [phone]    [facility]  [address]  [place], [place] [address]  [phone]     [facility]   [address] [place], [place]   [phone]     2. Establish care with a primary care provider  Physician Finder [phone]     [facility]  [address], [address], [place] [address]  [phone]  ADILIA Luke,CNP    [facility]  [address] [place] [address]  [phone]  MD Verna Pacheco MD Deanna N. Smith, APRN, CNP  Veronica Leon, ADILIA Crenshaw, CNP     [facility]  [address], [place] [address]  [phone]  Yaniv Bautista, DO Raffaele Tanner, PAMD Alli Ramires, PAANDREW    [facility]  [address] [place] [address]  [phone]  Anita Land MD    [facility]  [address] [place] [address] [phone]  MD Gus Lyn MD    [facility]  [address] [place] [address]  [phone]  ADILIA Issa, CNP  ADILIA Oconnell, CNP   MD Dorothea Churchill, PAMynorC   Cr Lazo MD    [facility] (*Active Waiting List*)   [address] [place], [place]  [phone]    Dr. Gil Duarte MD  [address] [place] [address]  [phone]    [facility]  [address], [place] [address] [phone]    [facility]  [address]  , [place] [address] [phone]  DO Elvin Padilla 
[Negative] : Genitourinary

## 2024-12-07 LAB
HIV 1+2 AB+HIV1 P24 AG SERPL QL IA: NONREACTIVE
T PALLIDUM AB SER QL IA: NONREACTIVE

## 2024-12-07 PROCEDURE — 86592 SYPHILIS TEST NON-TREP QUAL: CPT

## 2024-12-07 PROCEDURE — 99231 SBSQ HOSP IP/OBS SF/LOW 25: CPT | Performed by: NURSE PRACTITIONER

## 2024-12-07 PROCEDURE — 6360000002 HC RX W HCPCS: Performed by: STUDENT IN AN ORGANIZED HEALTH CARE EDUCATION/TRAINING PROGRAM

## 2024-12-07 PROCEDURE — 86780 TREPONEMA PALLIDUM: CPT

## 2024-12-07 PROCEDURE — 2580000003 HC RX 258: Performed by: STUDENT IN AN ORGANIZED HEALTH CARE EDUCATION/TRAINING PROGRAM

## 2024-12-07 PROCEDURE — 6370000000 HC RX 637 (ALT 250 FOR IP): Performed by: STUDENT IN AN ORGANIZED HEALTH CARE EDUCATION/TRAINING PROGRAM

## 2024-12-07 PROCEDURE — 94761 N-INVAS EAR/PLS OXIMETRY MLT: CPT

## 2024-12-07 PROCEDURE — 1200000000 HC SEMI PRIVATE

## 2024-12-07 PROCEDURE — 6370000000 HC RX 637 (ALT 250 FOR IP): Performed by: FAMILY MEDICINE

## 2024-12-07 PROCEDURE — APPNB15 APP NON BILLABLE TIME 0-15 MINS: Performed by: NURSE PRACTITIONER

## 2024-12-07 PROCEDURE — 87389 HIV-1 AG W/HIV-1&-2 AB AG IA: CPT

## 2024-12-07 PROCEDURE — 2580000003 HC RX 258: Performed by: FAMILY MEDICINE

## 2024-12-07 PROCEDURE — 36415 COLL VENOUS BLD VENIPUNCTURE: CPT

## 2024-12-07 RX ORDER — CALCIUM CARBONATE 500 MG/1
500 TABLET, CHEWABLE ORAL 3 TIMES DAILY PRN
Status: DISCONTINUED | OUTPATIENT
Start: 2024-12-07 | End: 2024-12-08 | Stop reason: HOSPADM

## 2024-12-07 RX ORDER — ESCITALOPRAM OXALATE 10 MG/1
10 TABLET ORAL DAILY
COMMUNITY

## 2024-12-07 RX ORDER — BUTALBITAL, ACETAMINOPHEN AND CAFFEINE 300; 40; 50 MG/1; MG/1; MG/1
1 CAPSULE ORAL EVERY 4 HOURS PRN
Status: DISCONTINUED | OUTPATIENT
Start: 2024-12-07 | End: 2024-12-08 | Stop reason: HOSPADM

## 2024-12-07 RX ADMIN — LAMOTRIGINE 25 MG: 25 TABLET ORAL at 08:21

## 2024-12-07 RX ADMIN — SENNOSIDES 8.6 MG: 8.6 TABLET, FILM COATED ORAL at 21:32

## 2024-12-07 RX ADMIN — POLYETHYLENE GLYCOL (3350) 17 G: 17 POWDER, FOR SOLUTION ORAL at 14:02

## 2024-12-07 RX ADMIN — WATER 1000 MG: 1 INJECTION INTRAMUSCULAR; INTRAVENOUS; SUBCUTANEOUS at 00:59

## 2024-12-07 RX ADMIN — CALCIUM CARBONATE 500 MG: 500 TABLET, CHEWABLE ORAL at 11:09

## 2024-12-07 RX ADMIN — LAMOTRIGINE 25 MG: 25 TABLET ORAL at 21:32

## 2024-12-07 RX ADMIN — SODIUM CHLORIDE: 9 INJECTION, SOLUTION INTRAVENOUS at 11:14

## 2024-12-07 RX ADMIN — GABAPENTIN 800 MG: 400 CAPSULE ORAL at 21:32

## 2024-12-07 RX ADMIN — ARIPIPRAZOLE 5 MG: 5 TABLET ORAL at 21:32

## 2024-12-07 RX ADMIN — KETOROLAC TROMETHAMINE 15 MG: 15 INJECTION, SOLUTION INTRAMUSCULAR; INTRAVENOUS at 08:20

## 2024-12-07 RX ADMIN — LAMOTRIGINE 25 MG: 25 TABLET ORAL at 16:28

## 2024-12-07 RX ADMIN — ENOXAPARIN SODIUM 40 MG: 100 INJECTION SUBCUTANEOUS at 08:21

## 2024-12-07 RX ADMIN — ACETAMINOPHEN 650 MG: 325 TABLET ORAL at 21:32

## 2024-12-07 RX ADMIN — GABAPENTIN 800 MG: 400 CAPSULE ORAL at 08:21

## 2024-12-07 RX ADMIN — BUTALBITA,ACETAMINOPHEN AND CAFFEINE 1 CAPSULE: 50; 300; 40 CAPSULE ORAL at 05:09

## 2024-12-07 RX ADMIN — SODIUM CHLORIDE, PRESERVATIVE FREE 10 ML: 5 INJECTION INTRAVENOUS at 08:21

## 2024-12-07 RX ADMIN — BUTALBITA,ACETAMINOPHEN AND CAFFEINE 1 CAPSULE: 50; 300; 40 CAPSULE ORAL at 21:36

## 2024-12-07 ASSESSMENT — PAIN DESCRIPTION - ORIENTATION
ORIENTATION: MID;UPPER
ORIENTATION: OTHER (COMMENT)
ORIENTATION: RIGHT;LEFT
ORIENTATION: RIGHT;LEFT

## 2024-12-07 ASSESSMENT — PAIN SCALES - GENERAL
PAINLEVEL_OUTOF10: 8
PAINLEVEL_OUTOF10: 8
PAINLEVEL_OUTOF10: 10
PAINLEVEL_OUTOF10: 7

## 2024-12-07 ASSESSMENT — PAIN DESCRIPTION - FREQUENCY
FREQUENCY: CONTINUOUS
FREQUENCY: CONTINUOUS

## 2024-12-07 ASSESSMENT — PAIN DESCRIPTION - DESCRIPTORS
DESCRIPTORS: ACHING
DESCRIPTORS: ACHING
DESCRIPTORS: POUNDING
DESCRIPTORS: ACHING

## 2024-12-07 ASSESSMENT — PAIN DESCRIPTION - LOCATION
LOCATION: HEAD
LOCATION: HEAD
LOCATION: BACK;HEAD
LOCATION: HEAD

## 2024-12-07 ASSESSMENT — PAIN - FUNCTIONAL ASSESSMENT
PAIN_FUNCTIONAL_ASSESSMENT: ACTIVITIES ARE NOT PREVENTED
PAIN_FUNCTIONAL_ASSESSMENT: PREVENTS OR INTERFERES SOME ACTIVE ACTIVITIES AND ADLS
PAIN_FUNCTIONAL_ASSESSMENT: ACTIVITIES ARE NOT PREVENTED

## 2024-12-07 ASSESSMENT — PAIN DESCRIPTION - ONSET: ONSET: PROGRESSIVE

## 2024-12-07 ASSESSMENT — PAIN DESCRIPTION - PAIN TYPE: TYPE: ACUTE PAIN

## 2024-12-07 NOTE — CONSULTS
Consult received, upon presentation to room, nurse in room stating IV is positional.  With IV currently working will hold off on placing new line.  If any further needs arise, please re-consult VATeam.      Consult the Vascular Access Team for questions, concerns, or change in patient's condition.

## 2024-12-07 NOTE — PROGRESS NOTES
GENERAL SURGERY INPATIENT PROGRESS NOTE  [facility]    PATIENT: Natacha Escudero, 43 y.o., female, MRN: [id number]    Hospital Day:  LOS: 2 days                 Subjective:    Patient's pain in abdomen has improved.  She had 2 BMs yesterday    Objective:    Vitals: /66   Pulse 93   Temp 98.7 °F (37.1 °C) (Oral)   Resp 14   Ht 1.6 m (5' 3\")   Wt 75.8 kg (167 lb)   SpO2 93%   BMI 29.58 kg/m²     I/O: No intake/output data recorded.    Physical Exam:  General Appearance:   Alert, cooperative, no distress    Head:   Normocephalic, atraumatic    Lungs:    Equal chest rise, respirations unlabored   Heart:   Regular rate and rhythm    Abdomen:    Soft, non-tender, no rebound or guarding    Extremities:  No cyanosis or edema    Neurologic:  Nonfocal, grossly intact        Labs/Imaging Results: No results found for this or any previous visit (from the past 24 hour(s)).      Assessment:  Natacha Escudero is a 43 y.o. female flank pain and nausea.  CT with stool burden and possible pneumatosis      BM/flatus:  x2 yesterday  Diet: tolerating   ADAT  Continue bowel regimen  Will sign off, please call if needed.    REVA Harrington  12/7/2024  9:33 AM    
Spoke with ED nurse about patient being very drowsy.  ED nurse said vistor came and left 30min before patient came to unit.  Vistor was very upset and came back and was in patients room left 30min before patient came up stair.  ED nurse said patient slept this morning but throughout day before she came up was very upset about not be given any pain medication.  Was not drowsy until after vistor left.  Dr. Cuevas was notified of this and will talk to patient more about this in morning will continue to monitor patient and notify doctor of any change in condition.  
questionable pneumatosis involving the cecum and proximal ascending colon. No portal venous gas is seen. No free air or free fluid is identified. Evaluation of the osseous structures demonstrates no acute findings.     1. Wedge-shaped areas of hypoattenuation involving the right renal cortex as well as a small focal wedge-shaped area of hypoattenuation involving lower pole of the left kidney. Additionally, there is right perinephric stranding. Findings likely relate to acute pyelonephritis. No hydronephrosis or obstructive uropathy seen. 2. Moderate retained stool in the colon. Additionally, there are air distended loops of large bowel without obstruction. There is questionable pneumatosis involving the cecum and proximal ascending colon wall. However, no portal venous gas is seen. 3. Small right adrenal gland nodule likely relating to an adenoma. 4. Trace amount of free fluid in the posterior cul-de-sac of the pelvis may be on a physiologic basis. 5. Additional findings as above. Electronically signed by Romel Posada      Electronically signed by Mason Lazo MD on 12/7/2024 at 10:29 AM    
size, and use of iterative reconstruction technique. IV Contrast: 75 mL Isovue-370 Oral Contrast: None. FINDINGS: Lung bases demonstrates atelectatic changes. The liver and spleen enhance homogeneously. Gallbladder is surgically absent. Pancreas, biliary system, and left adrenal gland are unremarkable. There is a small right adrenal gland nodule measuring approximately 10 mm likely related to an adrenal adenoma. Abdominal aorta demonstrates a normal caliber. There are wedge-shaped areas of hypoattenuation involving the right renal cortex as well as a mild area of wedge-shaped hypoattenuation involving the lower pole of the left kidney cortex concerning for acute pyelonephritis. There is mild right perinephric stranding. No hydronephrosis or hydroureter is present bilaterally. Urinary bladder is smoothly contoured. There are small fat-containing bilateral inguinal hernias. Uterus is present. Bilateral ovarian follicular changes are present. There is a trace amount of free fluid in the pelvis. There is moderate retained stool in the colon without obstruction. There are air distended loops of large bowel. There is questionable pneumatosis involving the cecum and proximal ascending colon. No portal venous gas is seen. No free air or free fluid is identified. Evaluation of the osseous structures demonstrates no acute findings.     1. Wedge-shaped areas of hypoattenuation involving the right renal cortex as well as a small focal wedge-shaped area of hypoattenuation involving lower pole of the left kidney. Additionally, there is right perinephric stranding. Findings likely relate to acute pyelonephritis. No hydronephrosis or obstructive uropathy seen. 2. Moderate retained stool in the colon. Additionally, there are air distended loops of large bowel without obstruction. There is questionable pneumatosis involving the cecum and proximal ascending colon wall. However, no portal venous gas is seen. 3. Small right adrenal

## 2024-12-08 VITALS
HEART RATE: 73 BPM | SYSTOLIC BLOOD PRESSURE: 164 MMHG | OXYGEN SATURATION: 98 % | HEIGHT: 63 IN | BODY MASS INDEX: 33.4 KG/M2 | DIASTOLIC BLOOD PRESSURE: 94 MMHG | RESPIRATION RATE: 19 BRPM | WEIGHT: 188.49 LBS | TEMPERATURE: 98.1 F

## 2024-12-08 LAB
ACB COMPLEX DNA BLD POS QL NAA+NON-PROBE: NOT DETECTED
B FRAGILIS DNA BLD POS QL NAA+NON-PROBE: NOT DETECTED
BLACTX-M ISLT/SPM QL: NOT DETECTED
BLAIMP ISLT/SPM QL: NOT DETECTED
BLAKPC ISLT/SPM QL: NOT DETECTED
BLAOXA-48-LIKE ISLT/SPM QL: NOT DETECTED
BLAVIM ISLT/SPM QL: NOT DETECTED
C ALBICANS DNA BLD POS QL NAA+NON-PROBE: NOT DETECTED
C AURIS DNA BLD POS QL NAA+NON-PROBE: NOT DETECTED
C GATTII+NEOFOR DNA BLD POS QL NAA+N-PRB: NOT DETECTED
C GLABRATA DNA BLD POS QL NAA+NON-PROBE: NOT DETECTED
C KRUSEI DNA BLD POS QL NAA+NON-PROBE: NOT DETECTED
C PARAP DNA BLD POS QL NAA+NON-PROBE: NOT DETECTED
C TROPICLS DNA BLD POS QL NAA+NON-PROBE: NOT DETECTED
COLISTIN RES MCR-1 ISLT/SPM QL: NOT DETECTED
E CLOAC COMP DNA BLD POS NAA+NON-PROBE: NOT DETECTED
E COLI DNA BLD POS QL NAA+NON-PROBE: DETECTED
E FAECALIS DNA BLD POS QL NAA+NON-PROBE: NOT DETECTED
E FAECIUM DNA BLD POS QL NAA+NON-PROBE: NOT DETECTED
ENTEROBACTERALES DNA BLD POS NAA+N-PRB: DETECTED
GP B STREP DNA BLD POS QL NAA+NON-PROBE: NOT DETECTED
HAEM INFLU DNA BLD POS QL NAA+NON-PROBE: NOT DETECTED
K OXYTOCA DNA BLD POS QL NAA+NON-PROBE: NOT DETECTED
KLEBSIELLA SP DNA BLD POS QL NAA+NON-PRB: NOT DETECTED
KLEBSIELLA SP DNA BLD POS QL NAA+NON-PRB: NOT DETECTED
L MONOCYTOG DNA BLD POS QL NAA+NON-PROBE: NOT DETECTED
MICROORGANISM SPEC CULT: ABNORMAL
MICROORGANISM SPEC CULT: ABNORMAL
MICROORGANISM/AGENT SPEC: ABNORMAL
MICROORGANISM/AGENT SPEC: ABNORMAL
N MEN DNA BLD POS QL NAA+NON-PROBE: NOT DETECTED
P AERUGINOSA DNA BLD POS NAA+NON-PROBE: NOT DETECTED
PROTEUS SP DNA BLD POS QL NAA+NON-PROBE: NOT DETECTED
RESISTANT GENE NDM BY PCR: NOT DETECTED
S AUREUS DNA BLD POS QL NAA+NON-PROBE: NOT DETECTED
S AUREUS+CONS DNA BLD POS NAA+NON-PROBE: NOT DETECTED
S EPIDERMIDIS DNA BLD POS QL NAA+NON-PRB: NOT DETECTED
S LUGDUNENSIS DNA BLD POS QL NAA+NON-PRB: NOT DETECTED
S MALTOPHILIA DNA BLD POS QL NAA+NON-PRB: NOT DETECTED
S MARCESCENS DNA BLD POS NAA+NON-PROBE: NOT DETECTED
S PNEUM DNA BLD POS QL NAA+NON-PROBE: NOT DETECTED
S PYO DNA BLD POS QL NAA+NON-PROBE: ABNORMAL
SALMONELLA DNA BLD POS QL NAA+NON-PROBE: NOT DETECTED
SERVICE CMNT-IMP: ABNORMAL
SERVICE CMNT-IMP: ABNORMAL
SPECIMEN DESCRIPTION: ABNORMAL
SPECIMEN DESCRIPTION: ABNORMAL
STREPTOCOCCUS DNA BLD POS NAA+NON-PROBE: NOT DETECTED

## 2024-12-08 PROCEDURE — 6360000002 HC RX W HCPCS: Performed by: STUDENT IN AN ORGANIZED HEALTH CARE EDUCATION/TRAINING PROGRAM

## 2024-12-08 PROCEDURE — 6370000000 HC RX 637 (ALT 250 FOR IP): Performed by: STUDENT IN AN ORGANIZED HEALTH CARE EDUCATION/TRAINING PROGRAM

## 2024-12-08 PROCEDURE — 2580000003 HC RX 258: Performed by: STUDENT IN AN ORGANIZED HEALTH CARE EDUCATION/TRAINING PROGRAM

## 2024-12-08 RX ORDER — CIPROFLOXACIN 500 MG/1
500 TABLET, FILM COATED ORAL 2 TIMES DAILY
Qty: 20 TABLET | Refills: 0 | Status: SHIPPED | OUTPATIENT
Start: 2024-12-08 | End: 2024-12-18

## 2024-12-08 RX ADMIN — LAMOTRIGINE 25 MG: 25 TABLET ORAL at 09:52

## 2024-12-08 RX ADMIN — GABAPENTIN 800 MG: 400 CAPSULE ORAL at 09:53

## 2024-12-08 RX ADMIN — POLYETHYLENE GLYCOL (3350) 17 G: 17 POWDER, FOR SOLUTION ORAL at 09:58

## 2024-12-08 RX ADMIN — SODIUM CHLORIDE, PRESERVATIVE FREE 10 ML: 5 INJECTION INTRAVENOUS at 09:53

## 2024-12-08 RX ADMIN — ENOXAPARIN SODIUM 40 MG: 100 INJECTION SUBCUTANEOUS at 09:53

## 2024-12-08 RX ADMIN — WATER 1000 MG: 1 INJECTION INTRAMUSCULAR; INTRAVENOUS; SUBCUTANEOUS at 00:33

## 2024-12-08 NOTE — DISCHARGE INSTR - DIET

## 2025-01-28 ENCOUNTER — OFFICE VISIT (OUTPATIENT)
Age: 45
End: 2025-01-28

## 2025-01-28 VITALS
HEIGHT: 63 IN | HEART RATE: 89 BPM | RESPIRATION RATE: 20 BRPM | DIASTOLIC BLOOD PRESSURE: 110 MMHG | SYSTOLIC BLOOD PRESSURE: 160 MMHG | OXYGEN SATURATION: 100 % | BODY MASS INDEX: 30.65 KG/M2 | WEIGHT: 173 LBS

## 2025-01-28 DIAGNOSIS — F31.81 BIPOLAR 2 DISORDER (HCC): Primary | ICD-10-CM

## 2025-01-28 DIAGNOSIS — R06.81 APNEIC EPISODE: ICD-10-CM

## 2025-01-28 DIAGNOSIS — B19.20 HEPATITIS C VIRUS INFECTION WITHOUT HEPATIC COMA, UNSPECIFIED CHRONICITY: ICD-10-CM

## 2025-01-28 DIAGNOSIS — F19.11 INTRAVENOUS DRUG ABUSE IN REMISSION (HCC): ICD-10-CM

## 2025-01-28 DIAGNOSIS — Z11.4 SCREENING FOR HIV (HUMAN IMMUNODEFICIENCY VIRUS): ICD-10-CM

## 2025-01-28 DIAGNOSIS — R53.83 FATIGUE, UNSPECIFIED TYPE: ICD-10-CM

## 2025-01-28 DIAGNOSIS — G47.19 EXCESSIVE DAYTIME SLEEPINESS: ICD-10-CM

## 2025-01-28 DIAGNOSIS — Z13.220 LIPID SCREENING: ICD-10-CM

## 2025-01-28 DIAGNOSIS — G62.9 NEUROPATHY: ICD-10-CM

## 2025-01-28 PROBLEM — Z11.59 NEED FOR HEPATITIS C SCREENING TEST: Status: RESOLVED | Noted: 2025-01-28 | Resolved: 2025-01-28

## 2025-01-28 PROBLEM — Z11.59 NEED FOR HEPATITIS C SCREENING TEST: Status: ACTIVE | Noted: 2025-01-28

## 2025-01-28 RX ORDER — ARIPIPRAZOLE 5 MG/1
5 TABLET ORAL NIGHTLY
Qty: 30 TABLET | Refills: 0 | Status: SHIPPED | OUTPATIENT
Start: 2025-01-28 | End: 2025-02-27

## 2025-01-28 RX ORDER — GABAPENTIN 800 MG/1
800 TABLET ORAL 3 TIMES DAILY
Qty: 90 TABLET | Refills: 0 | Status: SHIPPED | OUTPATIENT
Start: 2025-01-28 | End: 2025-02-27

## 2025-01-28 RX ORDER — LAMOTRIGINE 25 MG/1
50 TABLET ORAL EVERY MORNING
Qty: 30 TABLET | Refills: 0 | Status: SHIPPED | OUTPATIENT
Start: 2025-01-28

## 2025-01-28 RX ORDER — ESCITALOPRAM OXALATE 10 MG/1
10 TABLET ORAL DAILY
Qty: 30 TABLET | Refills: 0 | Status: SHIPPED | OUTPATIENT
Start: 2025-01-28

## 2025-01-28 SDOH — HEALTH STABILITY: PHYSICAL HEALTH: ON AVERAGE, HOW MANY MINUTES DO YOU ENGAGE IN EXERCISE AT THIS LEVEL?: 10 MIN

## 2025-01-28 SDOH — ECONOMIC STABILITY: FOOD INSECURITY: WITHIN THE PAST 12 MONTHS, YOU WORRIED THAT YOUR FOOD WOULD RUN OUT BEFORE YOU GOT MONEY TO BUY MORE.: NEVER TRUE

## 2025-01-28 SDOH — HEALTH STABILITY: PHYSICAL HEALTH: ON AVERAGE, HOW MANY DAYS PER WEEK DO YOU ENGAGE IN MODERATE TO STRENUOUS EXERCISE (LIKE A BRISK WALK)?: 3 DAYS

## 2025-01-28 SDOH — ECONOMIC STABILITY: FOOD INSECURITY: WITHIN THE PAST 12 MONTHS, THE FOOD YOU BOUGHT JUST DIDN'T LAST AND YOU DIDN'T HAVE MONEY TO GET MORE.: NEVER TRUE

## 2025-01-28 ASSESSMENT — PATIENT HEALTH QUESTIONNAIRE - PHQ9
7. TROUBLE CONCENTRATING ON THINGS, SUCH AS READING THE NEWSPAPER OR WATCHING TELEVISION: NEARLY EVERY DAY
1. LITTLE INTEREST OR PLEASURE IN DOING THINGS: NEARLY EVERY DAY
5. POOR APPETITE OR OVEREATING: NEARLY EVERY DAY
3. TROUBLE FALLING OR STAYING ASLEEP: NEARLY EVERY DAY
SUM OF ALL RESPONSES TO PHQ QUESTIONS 1-9: 22
9. THOUGHTS THAT YOU WOULD BE BETTER OFF DEAD, OR OF HURTING YOURSELF: NOT AT ALL
SUM OF ALL RESPONSES TO PHQ QUESTIONS 1-9: 22
SUM OF ALL RESPONSES TO PHQ QUESTIONS 1-9: 22
2. FEELING DOWN, DEPRESSED OR HOPELESS: NEARLY EVERY DAY
8. MOVING OR SPEAKING SO SLOWLY THAT OTHER PEOPLE COULD HAVE NOTICED. OR THE OPPOSITE, BEING SO FIGETY OR RESTLESS THAT YOU HAVE BEEN MOVING AROUND A LOT MORE THAN USUAL: SEVERAL DAYS
10. IF YOU CHECKED OFF ANY PROBLEMS, HOW DIFFICULT HAVE THESE PROBLEMS MADE IT FOR YOU TO DO YOUR WORK, TAKE CARE OF THINGS AT HOME, OR GET ALONG WITH OTHER PEOPLE: EXTREMELY DIFFICULT
4. FEELING TIRED OR HAVING LITTLE ENERGY: NEARLY EVERY DAY
6. FEELING BAD ABOUT YOURSELF - OR THAT YOU ARE A FAILURE OR HAVE LET YOURSELF OR YOUR FAMILY DOWN: NEARLY EVERY DAY
SUM OF ALL RESPONSES TO PHQ QUESTIONS 1-9: 22
SUM OF ALL RESPONSES TO PHQ9 QUESTIONS 1 & 2: 6

## 2025-01-28 ASSESSMENT — COLUMBIA-SUICIDE SEVERITY RATING SCALE - C-SSRS
6. HAVE YOU EVER DONE ANYTHING, STARTED TO DO ANYTHING, OR PREPARED TO DO ANYTHING TO END YOUR LIFE?: NO
2. HAVE YOU ACTUALLY HAD ANY THOUGHTS OF KILLING YOURSELF?: NO
1. WITHIN THE PAST MONTH, HAVE YOU WISHED YOU WERE DEAD OR WISHED YOU COULD GO TO SLEEP AND NOT WAKE UP?: NO

## 2025-01-28 NOTE — PROGRESS NOTES
[facility]  [address]  [facility] [address]  Dept: [phone]  Dept Fax: [phone]  Loc: [phone]      Visit type: New patient    Encounter Start Time: 9:43 AM EST  Encounter End Time: 10:22 AM EST     100% of the time was spent with the patient today, discussing their symptoms, conducting an examination, reviewing the patient's diagnostic test results, and counseling    Reason for Visit: Establish Care (Will speak with the doctor pt. Recently had a stroke)      Assessment and Plan         Assessment & Plan  Bipolar disorder type 2  Uncontrolled  She does not meet the criteria for bipolar type 1 but exhibits potential signs of ADHD. Her diagnosis will be updated to bipolar type 2.  - Blood work will be ordered to rule out any underlying medical conditions contributing to her symptoms.  - Referral to an in-house psychologist for ADHD testing.  - Refill current medications.  If symptoms persist, consider adjusting medication.    ADHD (suspected)  Uncontrolled  She exhibits potential signs of ADHD.  - Referral to an in-house psychologist for ADHD testing.  Pending  If ADHD is confirmed, initiate appropriate treatment.    Sleep apnea  Uncontrolled  She has a history of sleep apnea but does not currently use a CPAP machine. The importance of treating sleep apnea to prevent complications such as hypertension, insulin resistance, and right-sided heart failure was discussed.  - Referral to a sleep center for further evaluation and management.  Pending  If sleep apnea is confirmed, consider CPAP therapy.    Restless leg syndrome  Uncontrolled  She reports symptoms of restless leg syndrome, likely related to her sleep apnea.  - Further evaluation and management will be coordinated with the sleep center referral.  Pending  If symptoms worsen, consider medication.    Hepatitis C  Uncontrolled  She has a history of hepatitis C from IV drug use and has not received

## 2025-02-04 DIAGNOSIS — F31.81 BIPOLAR 2 DISORDER (HCC): ICD-10-CM

## 2025-02-04 RX ORDER — LAMOTRIGINE 25 MG/1
50 TABLET ORAL EVERY MORNING
Qty: 180 TABLET | Refills: 1 | OUTPATIENT
Start: 2025-02-04

## 2025-02-04 NOTE — TELEPHONE ENCOUNTER
Patient recently had a refill of this medication.    Marilee Torres MD  Family Medicine  02/04/25  2:09 PM

## 2025-02-11 ENCOUNTER — NURSE ONLY (OUTPATIENT)
Age: 45
End: 2025-02-11

## 2025-02-11 DIAGNOSIS — F31.81 BIPOLAR 2 DISORDER (HCC): ICD-10-CM

## 2025-02-11 DIAGNOSIS — B19.20 HEPATITIS C VIRUS INFECTION WITHOUT HEPATIC COMA, UNSPECIFIED CHRONICITY: ICD-10-CM

## 2025-02-11 DIAGNOSIS — R53.83 FATIGUE, UNSPECIFIED TYPE: ICD-10-CM

## 2025-02-11 DIAGNOSIS — G62.9 NEUROPATHY: ICD-10-CM

## 2025-02-11 DIAGNOSIS — Z13.220 LIPID SCREENING: ICD-10-CM

## 2025-02-11 DIAGNOSIS — Z11.4 SCREENING FOR HIV (HUMAN IMMUNODEFICIENCY VIRUS): ICD-10-CM

## 2025-02-11 DIAGNOSIS — G62.9 NEUROPATHY: Primary | ICD-10-CM

## 2025-02-12 ENCOUNTER — HOSPITAL ENCOUNTER (OUTPATIENT)
Dept: SLEEP CENTER | Age: 45
Discharge: HOME | End: 2025-02-12
Payer: COMMERCIAL

## 2025-02-12 VITALS
OXYGEN SATURATION: 96 % | SYSTOLIC BLOOD PRESSURE: 131 MMHG | WEIGHT: 173 LBS | HEART RATE: 94 BPM | BODY MASS INDEX: 30.65 KG/M2 | DIASTOLIC BLOOD PRESSURE: 71 MMHG | HEIGHT: 63 IN

## 2025-02-12 DIAGNOSIS — G47.10 HYPERSOMNIA: ICD-10-CM

## 2025-02-12 DIAGNOSIS — G47.33 OSA (OBSTRUCTIVE SLEEP APNEA): Primary | ICD-10-CM

## 2025-02-12 DIAGNOSIS — E66.9 OBESITY (BMI 30-39.9): ICD-10-CM

## 2025-02-12 DIAGNOSIS — F17.210 CIGARETTE SMOKER: ICD-10-CM

## 2025-02-12 PROCEDURE — 99204 OFFICE O/P NEW MOD 45 MIN: CPT | Performed by: INTERNAL MEDICINE

## 2025-02-12 PROCEDURE — 99211 OFF/OP EST MAY X REQ PHY/QHP: CPT

## 2025-02-12 ASSESSMENT — SLEEP AND FATIGUE QUESTIONNAIRES
HOW LIKELY ARE YOU TO NOD OFF OR FALL ASLEEP WHILE SITTING INACTIVE IN A PUBLIC PLACE: WOULD NEVER DOZE
HOW LIKELY ARE YOU TO NOD OFF OR FALL ASLEEP WHILE LYING DOWN TO REST IN THE AFTERNOON WHEN CIRCUMSTANCES PERMIT: HIGH CHANCE OF DOZING
HOW LIKELY ARE YOU TO NOD OFF OR FALL ASLEEP WHEN YOU ARE A PASSENGER IN A CAR FOR AN HOUR WITHOUT A BREAK: SLIGHT CHANCE OF DOZING
HOW LIKELY ARE YOU TO NOD OFF OR FALL ASLEEP WHILE SITTING AND TALKING TO SOMEONE: SLIGHT CHANCE OF DOZING
HOW LIKELY ARE YOU TO NOD OFF OR FALL ASLEEP WHILE WATCHING TV: SLIGHT CHANCE OF DOZING
HOW LIKELY ARE YOU TO NOD OFF OR FALL ASLEEP IN A CAR, WHILE STOPPED FOR A FEW MINUTES IN TRAFFIC: WOULD NEVER DOZE
HOW LIKELY ARE YOU TO NOD OFF OR FALL ASLEEP WHILE SITTING AND READING: MODERATE CHANCE OF DOZING
HOW LIKELY ARE YOU TO NOD OFF OR FALL ASLEEP WHILE SITTING QUIETLY AFTER LUNCH WITHOUT ALCOHOL: HIGH CHANCE OF DOZING
ESS TOTAL SCORE: 11

## 2025-02-12 NOTE — ASSESSMENT & PLAN NOTE
She has symptoms of MEREDITH  Advised to go for the PSG  Loose weight   At [facility], we strive to deliver an exceptional experience to you, every time we see you. If you receive a survey, please complete it as we do value your feedback.  If you have MyChart, you can expect to receive results automatically within 24 hours of their completion.  Your provider will send a note interpreting your results as well.   If you do not have MyChart, you should receive your results in about a week by mail.    Your care team:                            Family Medicine Internal Medicine   MD Germán More MD Shantel Branch-Fleming, MD Srinivasa Vaka, MD Katya Belousova, PAPAULA Fritz CNP, MD ([facility]   Júnior Smith, MD Harleen Mabry MD Amelia Massimini APRN ADELINE Espinoza APRN MD Jony Jones MD          Clinic hours: Monday - Thursday 7 am-6 pm; Fridays 7 am-5 pm.   Urgent care: Monday - Friday 10 am- 8 pm; Saturday and Sunday 9 am-5 pm.    Clinic: [phone]       [facility]: Monday - Thursday 8 am - 7 pm; Friday 8 am - 6 pm  [facility]: [phone]

## 2025-02-12 NOTE — CONSULTS
[facility]      Jason Tellez MD, FACP, [facility]  Buster Edwards MD, [facility]  Bro Orellana MD, [facility]  Natacha Jefferson DO  Ellen Capellan DO      [address]  [place] [address]   PH: [phone]  F: [phone]     Subjective:     Patient ID: Natacha Escudero is a 44 y.o. female, referred to the sleep center for   Chief Complaint   Patient presents with    G47.19    R06.81   .    Referring physician:  Marilee Torres MDRef Provider (PCP)      History:has been referred for the MEREDITH    Symptoms:   [x]  Snoring                                                                [x]  Dry Mouth  []  Choking                                                               [x]  Morning Headaches  []  Gasping for Air                                                   []  Trouble Falling asleep  [x]  Tired during the daytime                                    []  Trouble Staying Asleep  [x]  Tired when you wake up                                    [x]  Weight Gain in Last 5 Years  [x]  Wake up frequently at night                                []  Weight Loss in Last 5 Years  []  Shortness Of Breath                                            []  Shift Worker  []  Coughing                                                             [x]  Smoker (Previous or Current) 1 pk/day x 25 yrs and now 1/2 pk/day x 5 yrs  []  Chest Pain                                                         [x]  Anxiety  []  Trouble keeping your legs still at night                 [x]  Depression  []  Kicking your legs in your sleep                            []  Insomnia     [x]  Palpitation  [x]  Stop breathing      []  Other:     Significant Co-morbidities:  []  Congestive Heart Failure     []  COPD         []  Stroke (Past 30 Days)      []  Supplemental Oxygen Usage       []  Cognitive Impairment      []  Neuromuscular Problems  []  Epilepsy/Neurological Disorders           Social History

## 2025-02-12 NOTE — PROGRESS NOTES
attention/ care.      OTHER CRITERIA: (Acuity score must accompany comments in this section)   Level 1  Pt is nervous about testing

## 2025-02-19 DIAGNOSIS — F31.81 BIPOLAR 2 DISORDER (HCC): ICD-10-CM

## 2025-02-20 RX ORDER — ARIPIPRAZOLE 5 MG/1
5 TABLET ORAL
Qty: 90 TABLET | Refills: 1 | Status: SHIPPED | OUTPATIENT
Start: 2025-02-20

## 2025-02-20 RX ORDER — ESCITALOPRAM OXALATE 10 MG/1
10 TABLET ORAL DAILY
Qty: 90 TABLET | Refills: 1 | Status: SHIPPED | OUTPATIENT
Start: 2025-02-20

## 2025-02-21 DIAGNOSIS — G62.9 NEUROPATHY: ICD-10-CM

## 2025-02-24 RX ORDER — GABAPENTIN 800 MG/1
800 TABLET ORAL 3 TIMES DAILY
Qty: 90 TABLET | Refills: 0 | Status: SHIPPED | OUTPATIENT
Start: 2025-02-24 | End: 2025-03-26

## 2025-03-06 ENCOUNTER — COMMUNITY OUTREACH (OUTPATIENT)
Age: 45
End: 2025-03-06

## 2025-03-07 ENCOUNTER — TELEMEDICINE ON DEMAND (OUTPATIENT)
Age: 45
End: 2025-03-07
Payer: COMMERCIAL

## 2025-03-07 DIAGNOSIS — J34.0 ABSCESS, FURUNCLE AND CARBUNCLE OF NOSE: Primary | ICD-10-CM

## 2025-03-07 PROCEDURE — 99213 OFFICE O/P EST LOW 20 MIN: CPT | Performed by: NURSE PRACTITIONER

## 2025-03-07 PROCEDURE — 4004F PT TOBACCO SCREEN RCVD TLK: CPT | Performed by: NURSE PRACTITIONER

## 2025-03-07 PROCEDURE — G8428 CUR MEDS NOT DOCUMENT: HCPCS | Performed by: NURSE PRACTITIONER

## 2025-03-07 PROCEDURE — G8417 CALC BMI ABV UP PARAM F/U: HCPCS | Performed by: NURSE PRACTITIONER

## 2025-03-07 RX ORDER — DOXYCYCLINE HYCLATE 100 MG
100 TABLET ORAL 2 TIMES DAILY
Qty: 14 TABLET | Refills: 0 | Status: SHIPPED | OUTPATIENT
Start: 2025-03-07 | End: 2025-03-14

## 2025-03-07 RX ORDER — MUPIROCIN 20 MG/G
OINTMENT TOPICAL
Qty: 15 G | Refills: 0 | Status: SHIPPED | OUTPATIENT
Start: 2025-03-07 | End: 2025-03-14

## 2025-03-07 ASSESSMENT — ENCOUNTER SYMPTOMS
RESPIRATORY NEGATIVE: 1
FACIAL SWELLING: 1
GASTROINTESTINAL NEGATIVE: 1

## 2025-03-07 NOTE — PATIENT INSTRUCTIONS
Notify office if you have no improvement or worsening of condition.   Take medications as prescribed.  Keep area clean.  Good Handwashing.  Tylenol for pain/tenderness.  Do not use utensils to pull inside hairs. If not improving follow up with PCP in 2-3 days.  ED if worsening, pain, swelling, trouble breathing, bleeding, fever, etc.  Please refer to educational handouts.

## 2025-03-07 NOTE — PROGRESS NOTES
[facility] Primary Care Virtualist Encounter Note       Chief Complaint   Patient presents with    Nose bump inside and out       HPI:    Natacha Escudero (:  1980) has requested an audio/video evaluation for the following concern(s):    This is an established patient of Dr. Torres's. This is the first time I am seeing the patient today. She requested this visit for nose bump inside and out that is painful to touch and feels that it is infected. A couple days ago she \"plucked\" a inside nasal hair and thinks she may have gotten it infected now swollen and very tender. No drainage noted. No fever/chills. No SOB. No wheezing. No rash, No N/V/D. No other symptoms at this time.    Review of Systems   Constitutional:  Negative for chills, fatigue and fever.   HENT:  Positive for facial swelling (left nostril of nose slight red and swollen and tender to touch). Negative for nosebleeds and postnasal drip.    Respiratory: Negative.     Cardiovascular: Negative.    Gastrointestinal: Negative.    Genitourinary: Negative.    Musculoskeletal: Negative.    Neurological: Negative.        Prior to Visit Medications    Medication Sig Taking? Authorizing Provider   mupirocin (BACTROBAN) 2 % ointment Apply thin layer topically to nasal area 2 times daily x 10 days. Yes Awilda Alejandro APRN - CNP   doxycycline hyclate (VIBRA-TABS) 100 MG tablet Take 1 tablet by mouth 2 times daily for 7 days Yes Awilda Alejandro APRN - CNP   gabapentin (NEURONTIN) 800 MG tablet Take 1 tablet by mouth 3 times daily for 30 days.  Marilee Torres MD   ARIPiprazole (ABILIFY) 5 MG tablet TAKE 1 TABLET BY MOUTH EVERYDAY AT BEDTIME  Marilee Torres MD   escitalopram (LEXAPRO) 10 MG tablet TAKE 1 TABLET BY MOUTH EVERY DAY  Marilee Torres MD   Multiple Vitamins-Minerals (HAIR SKIN AND NAILS FORMULA PO) Take by mouth  ProviderLeda MD   lamoTRIgine (LAMICTAL) 25 MG tablet Take 2 tablets by mouth every morning

## 2025-03-26 DIAGNOSIS — F31.81 BIPOLAR 2 DISORDER (HCC): ICD-10-CM

## 2025-03-26 DIAGNOSIS — G62.9 NEUROPATHY: ICD-10-CM

## 2025-03-28 DIAGNOSIS — G62.9 NEUROPATHY: ICD-10-CM

## 2025-03-28 DIAGNOSIS — F31.81 BIPOLAR 2 DISORDER (HCC): ICD-10-CM

## 2025-03-28 NOTE — TELEPHONE ENCOUNTER
Patient overslept so missed apt this morning. Patient is out of medications and needs the following filled. Next apt scheduled for 5/5/2025. Patient unhappy with this date and states she can't go without medications until this date. She states that she had a long night last night and that's why she overslept. This nurse advised will send request and message to PCP and PCP's nurse to seek their recommendation. Please call patient back with plan.

## 2025-03-31 RX ORDER — GABAPENTIN 800 MG/1
800 TABLET ORAL 3 TIMES DAILY
Qty: 90 TABLET | Refills: 0 | Status: SHIPPED | OUTPATIENT
Start: 2025-03-31 | End: 2025-04-30

## 2025-03-31 RX ORDER — GABAPENTIN 800 MG/1
800 TABLET ORAL 3 TIMES DAILY
Qty: 90 TABLET | Refills: 0 | OUTPATIENT
Start: 2025-03-31 | End: 2025-04-30

## 2025-03-31 RX ORDER — LAMOTRIGINE 25 MG/1
50 TABLET ORAL EVERY MORNING
Qty: 30 TABLET | Refills: 0 | Status: SHIPPED | OUTPATIENT
Start: 2025-03-31

## 2025-03-31 RX ORDER — LAMOTRIGINE 25 MG/1
50 TABLET ORAL EVERY MORNING
Qty: 30 TABLET | Refills: 0 | OUTPATIENT
Start: 2025-03-31

## 2025-03-31 RX ORDER — ESCITALOPRAM OXALATE 10 MG/1
10 TABLET ORAL DAILY
Qty: 90 TABLET | Refills: 1 | Status: SHIPPED | OUTPATIENT
Start: 2025-03-31

## 2025-04-25 DIAGNOSIS — F31.81 BIPOLAR 2 DISORDER (HCC): ICD-10-CM

## 2025-04-25 DIAGNOSIS — G62.9 NEUROPATHY: ICD-10-CM

## 2025-04-29 RX ORDER — LAMOTRIGINE 25 MG/1
50 TABLET ORAL EVERY MORNING
Qty: 30 TABLET | Refills: 0 | Status: SHIPPED | OUTPATIENT
Start: 2025-04-29

## 2025-04-29 RX ORDER — GABAPENTIN 800 MG/1
800 TABLET ORAL 3 TIMES DAILY
Qty: 90 TABLET | Refills: 0 | Status: SHIPPED | OUTPATIENT
Start: 2025-04-29 | End: 2025-05-29

## 2025-05-21 DIAGNOSIS — F31.81 BIPOLAR 2 DISORDER (HCC): ICD-10-CM

## 2025-05-22 RX ORDER — LAMOTRIGINE 25 MG/1
50 TABLET ORAL EVERY MORNING
Qty: 30 TABLET | Refills: 0 | Status: SHIPPED | OUTPATIENT
Start: 2025-05-22

## 2025-05-23 DIAGNOSIS — G62.9 NEUROPATHY: ICD-10-CM

## 2025-05-29 RX ORDER — GABAPENTIN 800 MG/1
800 TABLET ORAL 3 TIMES DAILY
Qty: 90 TABLET | Refills: 0 | Status: SHIPPED | OUTPATIENT
Start: 2025-05-29 | End: 2025-06-28

## 2025-06-26 DIAGNOSIS — G62.9 NEUROPATHY: ICD-10-CM

## 2025-06-27 RX ORDER — GABAPENTIN 800 MG/1
800 TABLET ORAL 3 TIMES DAILY
Qty: 90 TABLET | Refills: 0 | Status: SHIPPED | OUTPATIENT
Start: 2025-06-27 | End: 2025-07-27

## 2025-06-28 DIAGNOSIS — F31.81 BIPOLAR 2 DISORDER (HCC): ICD-10-CM

## 2025-06-30 RX ORDER — LAMOTRIGINE 25 MG/1
50 TABLET ORAL EVERY MORNING
Qty: 30 TABLET | Refills: 0 | Status: SHIPPED | OUTPATIENT
Start: 2025-06-30 | End: 2025-07-03 | Stop reason: SDUPTHER

## 2025-06-30 NOTE — TELEPHONE ENCOUNTER
Please have patient make an appointment with PCP as no more refills will be given until appointment.

## 2025-07-03 ENCOUNTER — OFFICE VISIT (OUTPATIENT)
Age: 45
End: 2025-07-03
Payer: COMMERCIAL

## 2025-07-03 VITALS
SYSTOLIC BLOOD PRESSURE: 138 MMHG | BODY MASS INDEX: 31.75 KG/M2 | OXYGEN SATURATION: 97 % | DIASTOLIC BLOOD PRESSURE: 82 MMHG | WEIGHT: 176.4 LBS | RESPIRATION RATE: 16 BRPM | HEART RATE: 113 BPM

## 2025-07-03 DIAGNOSIS — F31.81 BIPOLAR 2 DISORDER (HCC): ICD-10-CM

## 2025-07-03 DIAGNOSIS — G62.9 NEUROPATHY: ICD-10-CM

## 2025-07-03 DIAGNOSIS — F43.0 ACUTE STRESS DISORDER: Primary | ICD-10-CM

## 2025-07-03 PROCEDURE — 99214 OFFICE O/P EST MOD 30 MIN: CPT

## 2025-07-03 PROCEDURE — G8427 DOCREV CUR MEDS BY ELIG CLIN: HCPCS

## 2025-07-03 PROCEDURE — G2211 COMPLEX E/M VISIT ADD ON: HCPCS

## 2025-07-03 PROCEDURE — 4004F PT TOBACCO SCREEN RCVD TLK: CPT

## 2025-07-03 PROCEDURE — G8417 CALC BMI ABV UP PARAM F/U: HCPCS

## 2025-07-03 RX ORDER — GABAPENTIN 800 MG/1
800 TABLET ORAL 3 TIMES DAILY
Qty: 90 TABLET | Refills: 0 | Status: SHIPPED | OUTPATIENT
Start: 2025-07-03 | End: 2025-08-02

## 2025-07-03 RX ORDER — DIAZEPAM 2 MG/1
2 TABLET ORAL EVERY 8 HOURS PRN
Qty: 21 TABLET | Refills: 0 | Status: SHIPPED | OUTPATIENT
Start: 2025-07-03 | End: 2025-07-10

## 2025-07-03 RX ORDER — VIT C/B6/B5/MAGNESIUM/HERB 173 50-5-6-5MG
CAPSULE ORAL DAILY
Status: CANCELLED | OUTPATIENT
Start: 2025-07-03

## 2025-07-03 RX ORDER — ARIPIPRAZOLE 5 MG/1
5 TABLET ORAL
Qty: 90 TABLET | Refills: 1 | Status: SHIPPED | OUTPATIENT
Start: 2025-07-03

## 2025-07-03 RX ORDER — ESCITALOPRAM OXALATE 10 MG/1
10 TABLET ORAL DAILY
Qty: 90 TABLET | Refills: 1 | Status: SHIPPED | OUTPATIENT
Start: 2025-07-03 | End: 2025-07-11

## 2025-07-03 RX ORDER — LAMOTRIGINE 25 MG/1
50 TABLET ORAL EVERY MORNING
Qty: 30 TABLET | Refills: 0 | Status: SHIPPED | OUTPATIENT
Start: 2025-07-03

## 2025-07-03 RX ORDER — BUPRENORPHINE AND NALOXONE 8; 2 MG/1; MG/1
0.5 FILM, SOLUBLE BUCCAL; SUBLINGUAL
Status: CANCELLED | OUTPATIENT
Start: 2025-07-03

## 2025-07-03 NOTE — PROGRESS NOTES
Went to the hospital recently for chest tightness and pain.     Cough    Nasal Congestion     Runny nose.        History of Present Illness  The patient is a 44-year-old female who presents for an ED follow-up.    She reports experiencing significant stress due to her son's recent incarceration, financial difficulties stemming from unemployment, and relationship issues with her boyfriend. She also mentions difficulty waking up in the morning, feeling well for a brief period upon waking, but then experiencing fatigue. She continues to take Suboxone and has requested Klonopin, expressing a dislike for Xanax as it makes her feel overly sedated. She has previously taken Valium, which she found beneficial. She has been purchasing Klonopin illicitly when stressed but does not abuse it. She has never been prescribed Klonopin before. She is currently on lamotrigine, gabapentin, Lexapro, and Abilify. She has discontinued turmeric and is taking multivitamins. She reports no suicidal or homicidal ideations. She describes her current mood as worried.    Two days ago, she experienced chest pain, dizziness, and shortness of breath during a stressful conversation about her son. She was given a potassium supplement at the hospital.    She expresses concern about her weight, which she feels is excessive and impedes her mobility. She finds it difficult to consume a full meal at once, opting instead to eat small amounts throughout the day to avoid stomach swelling. She has been prescribed Adipex in the past, which she found helpful.    She also reports excessive sweating, which she suspects may be related to menopause.    She has been experiencing eyesight problems and has been wearing readers over her contacts.        Past medical history reviewed  Medication review  Allergies reviewed  Social history reviewed    Objective       /82 (BP Site: Left Upper Arm, Patient Position: Sitting, BP Cuff Size: Medium Adult)   Pulse

## 2025-07-11 ENCOUNTER — OFFICE VISIT (OUTPATIENT)
Age: 45
End: 2025-07-11
Payer: COMMERCIAL

## 2025-07-11 VITALS
DIASTOLIC BLOOD PRESSURE: 68 MMHG | BODY MASS INDEX: 32.33 KG/M2 | RESPIRATION RATE: 16 BRPM | WEIGHT: 179.6 LBS | HEART RATE: 104 BPM | OXYGEN SATURATION: 98 % | SYSTOLIC BLOOD PRESSURE: 118 MMHG

## 2025-07-11 DIAGNOSIS — F41.9 ANXIETY: ICD-10-CM

## 2025-07-11 DIAGNOSIS — F31.81 BIPOLAR 2 DISORDER (HCC): ICD-10-CM

## 2025-07-11 DIAGNOSIS — G47.33 OSA (OBSTRUCTIVE SLEEP APNEA): Primary | ICD-10-CM

## 2025-07-11 PROCEDURE — G8417 CALC BMI ABV UP PARAM F/U: HCPCS

## 2025-07-11 PROCEDURE — G8427 DOCREV CUR MEDS BY ELIG CLIN: HCPCS

## 2025-07-11 PROCEDURE — 4004F PT TOBACCO SCREEN RCVD TLK: CPT

## 2025-07-11 PROCEDURE — G2211 COMPLEX E/M VISIT ADD ON: HCPCS

## 2025-07-11 PROCEDURE — 99214 OFFICE O/P EST MOD 30 MIN: CPT

## 2025-07-11 RX ORDER — CLONAZEPAM 1 MG/1
1 TABLET ORAL 2 TIMES DAILY PRN
Qty: 28 TABLET | Refills: 0 | Status: SHIPPED | OUTPATIENT
Start: 2025-07-11 | End: 2025-07-25

## 2025-07-11 RX ORDER — ESCITALOPRAM OXALATE 10 MG/1
20 TABLET ORAL DAILY
Qty: 90 TABLET | Refills: 1 | Status: SHIPPED | OUTPATIENT
Start: 2025-07-11

## 2025-07-24 DIAGNOSIS — F31.81 BIPOLAR 2 DISORDER (HCC): ICD-10-CM

## 2025-07-25 RX ORDER — LAMOTRIGINE 25 MG/1
50 TABLET ORAL EVERY MORNING
Qty: 30 TABLET | Refills: 0 | OUTPATIENT
Start: 2025-07-25

## 2025-07-25 NOTE — TELEPHONE ENCOUNTER
An upcoming appointment prior to the need for refill.  Will refill at upcoming.    Marilee Torres MD  Family Medicine  07/25/25  10:19 AM

## 2025-07-29 DIAGNOSIS — F43.0 ACUTE STRESS DISORDER: ICD-10-CM

## 2025-07-29 DIAGNOSIS — F31.81 BIPOLAR 2 DISORDER (HCC): ICD-10-CM

## 2025-07-29 DIAGNOSIS — G62.9 NEUROPATHY: ICD-10-CM

## 2025-07-30 RX ORDER — LAMOTRIGINE 25 MG/1
50 TABLET ORAL EVERY MORNING
Qty: 30 TABLET | Refills: 0 | Status: SHIPPED | OUTPATIENT
Start: 2025-07-30

## 2025-07-30 RX ORDER — GABAPENTIN 800 MG/1
800 TABLET ORAL 3 TIMES DAILY
Qty: 90 TABLET | Refills: 0 | Status: SHIPPED | OUTPATIENT
Start: 2025-07-30 | End: 2025-08-29

## 2025-07-30 RX ORDER — CLONAZEPAM 1 MG/1
1 TABLET ORAL 2 TIMES DAILY PRN
Qty: 28 TABLET | Refills: 0 | OUTPATIENT
Start: 2025-07-30 | End: 2025-08-13

## 2025-08-04 RX ORDER — PANTOPRAZOLE SODIUM 40 MG/1
40 TABLET, DELAYED RELEASE ORAL DAILY
COMMUNITY
Start: 2016-03-23

## 2025-08-06 PROBLEM — F41.9 ANXIETY: Status: ACTIVE | Noted: 2025-08-06
